# Patient Record
Sex: FEMALE | Race: WHITE | NOT HISPANIC OR LATINO | Employment: FULL TIME | URBAN - NONMETROPOLITAN AREA
[De-identification: names, ages, dates, MRNs, and addresses within clinical notes are randomized per-mention and may not be internally consistent; named-entity substitution may affect disease eponyms.]

---

## 2018-05-17 PROBLEM — G43.829 MENSTRUAL MIGRAINE WITHOUT STATUS MIGRAINOSUS, NOT INTRACTABLE: Status: ACTIVE | Noted: 2018-05-17

## 2018-11-05 PROBLEM — B35.1 ONYCHOMYCOSIS: Status: ACTIVE | Noted: 2018-11-05

## 2019-02-20 PROBLEM — N92.0 MENORRHAGIA WITH REGULAR CYCLE: Status: ACTIVE | Noted: 2019-02-20

## 2019-06-25 PROBLEM — E88.01 LOW PLASMA ALPHA-1 ANTITRYPSIN: Status: ACTIVE | Noted: 2019-06-25

## 2020-12-07 ENCOUNTER — TELEPHONE (OUTPATIENT)
Dept: FAMILY MEDICINE CLINIC | Facility: CLINIC | Age: 40
End: 2020-12-07

## 2020-12-07 NOTE — TELEPHONE ENCOUNTER
PT CALLED STATING SHE HAS A FEVER, SORE THROAT, AND BODY ACHES    PT AGREED TO A VIDEO VISIT BUT THE FIRST AVAILABLE WAS 12/11    PT STATED SHE DID TEST NEGATIVE FOR COVID AND STREP AT URGENT CARE    PLEASE ADVISE At: [phone]

## 2021-02-22 ENCOUNTER — TELEPHONE (OUTPATIENT)
Dept: FAMILY MEDICINE CLINIC | Facility: CLINIC | Age: 41
End: 2021-02-22

## 2021-02-22 NOTE — TELEPHONE ENCOUNTER
Patient is due for tetanus shot.  Can you place an order for patient to come in today and have it done?

## 2021-02-22 NOTE — TELEPHONE ENCOUNTER
PT CALLED REQUESTING AN ORDER FOR A TETANUS SHOT    PT IS REQUESTING TO GET IT TODAY    PLEASE ADVISE AT: [phone]

## 2021-03-19 PROBLEM — Z20.822 SUSPECTED COVID-19 VIRUS INFECTION: Status: RESOLVED | Noted: 2020-08-06 | Resolved: 2021-03-19

## 2021-08-23 ENCOUNTER — TELEPHONE (OUTPATIENT)
Dept: FAMILY MEDICINE CLINIC | Facility: CLINIC | Age: 41
End: 2021-08-23

## 2021-08-23 NOTE — TELEPHONE ENCOUNTER
Caller: Juliann Haynes    Relationship to patient: Self    Best call back number: [phone]    Date of exposure:LAST WEEK    Date of positive COVID19 test:     Date if possible COVID19 exposure:     COVID19 symptoms: SHORTNESS OF BREATH; COUGH    Date of initial quarantine:     Additional information or concerns:    What is the patients preferred pharmacy:    [id number] [place] [address] - [phone] [facility] [phone] FX

## 2021-08-24 NOTE — TELEPHONE ENCOUNTER
Spoke with patient she is having chest congestion and nasal congestion.  Patients  tested positive for COVID 08/17/2021.  She tested negative however, she is scheduled this morning with [facility] for COVID swab only.  She will let us know the results.

## 2022-06-13 ENCOUNTER — TELEPHONE (OUTPATIENT)
Dept: FAMILY MEDICINE CLINIC | Facility: CLINIC | Age: 42
End: 2022-06-13

## 2022-06-13 NOTE — TELEPHONE ENCOUNTER
Caller: rOen Juliann WEAVER    Relationship: Self    Best call back number: [phone]    What medication are you requesting: ANTIBIOTIC     What are your current symptoms: LEFT CHEEK IS SWOLLEN AND SORE,L HEADACHES, DIZZINESS   How long have you been experiencing symptoms: A WEEK     Have you had these symptoms before:    [x] Yes  [] No    Have you been treated for these symptoms before:   [x] Yes  [] No    If a prescription is needed, what is your preferred pharmacy and phone number[facility] [address] [place] [address] - [phone] [facility] [phone] FX     Additional notes:IVETH STATES THAT SHE DOES NOT SEEM TO BE GETTING ANY BETTER

## 2023-04-04 ENCOUNTER — OFFICE VISIT (OUTPATIENT)
Dept: FAMILY MEDICINE CLINIC | Facility: CLINIC | Age: 43
End: 2023-04-04
Payer: COMMERCIAL

## 2023-04-04 VITALS
DIASTOLIC BLOOD PRESSURE: 70 MMHG | HEART RATE: 75 BPM | HEIGHT: 60 IN | SYSTOLIC BLOOD PRESSURE: 116 MMHG | WEIGHT: 150 LBS | OXYGEN SATURATION: 98 % | BODY MASS INDEX: 29.45 KG/M2

## 2023-04-04 DIAGNOSIS — R07.81 PLEURITIC CHEST PAIN: ICD-10-CM

## 2023-04-04 DIAGNOSIS — R07.89 ATYPICAL CHEST PAIN: Primary | ICD-10-CM

## 2023-04-04 DIAGNOSIS — R10.13 EPIGASTRIC PAIN: ICD-10-CM

## 2023-04-04 DIAGNOSIS — Z87.891 EX-SMOKER: ICD-10-CM

## 2023-04-04 DIAGNOSIS — R12 HEARTBURN: ICD-10-CM

## 2023-04-04 PROCEDURE — 99214 OFFICE O/P EST MOD 30 MIN: CPT | Performed by: NURSE PRACTITIONER

## 2023-04-04 RX ORDER — PREDNISONE 10 MG/1
TABLET ORAL
Qty: 15 TABLET | Refills: 0 | Status: SHIPPED | OUTPATIENT
Start: 2023-04-04

## 2023-04-04 RX ORDER — TERBINAFINE HYDROCHLORIDE 250 MG/1
250 TABLET ORAL DAILY
COMMUNITY

## 2023-04-04 RX ORDER — OMEPRAZOLE 20 MG/1
20 CAPSULE, DELAYED RELEASE ORAL DAILY
Qty: 30 CAPSULE | Refills: 2 | Status: SHIPPED | OUTPATIENT
Start: 2023-04-04

## 2023-04-04 NOTE — PROGRESS NOTES
"                      Established Patient        Chief Complaint:   Chief Complaint   Patient presents with   • Chest Pain     Complaints of on going chest pain. Patient states that this has been on going for a year.  Patient would like to discuss getting blood work.         History of Present Illness:    Juliann CASTILLO is a 42 y.o. female who presents today with intermittent chest pain x 2 years. Has had intermittent chest pain throughout the day and sometimes left neck pain. Located epigastric region, sternum, right upper chest wall, and sometimes in her back. Reports that last night she was getting ready to lie down when she started to have chest pain, characterized as a \"throbbing\" and \"aching\" sensation. Aggravated by deep inspiration and spicy foods. Nothing alleviates the pain. Pain radiates to different portions of her chest. No specific time of the day. Pain scored as a 8 on a 0-10 scale at the worst and then it \"eases off.\" Seen for presenting complaint before by Dr. Gilliland--quit eating fried foods and butter--didn't help. Reports that she has been to a Dr. Dominic Artis, cardiologist 4/21; she wore a Holter monitor and had echocardiogram--normal results. Sees a pulmonologist because of having the Alpha 1 gene and diagnosis of asthma--quit smoking 2019.  Denies anxiety.    Last Lipid Panel 03/21  The 10-year ASCVD risk score (Anish GORDON, et al., 2019) is: 0.5%    Values used to calculate the score:      Age: 42 years      Sex: Female      Is Non- : No      Diabetic: No      Tobacco smoker: No      Systolic Blood Pressure: 116 mmHg      Is BP treated: No      HDL Cholesterol: 54 mg/dL      Total Cholesterol: 203 mg/dL    Subjective     The following portions of the patient's history were reviewed and updated as appropriate: allergies, current medications, past family history, past medical history, past social history, past surgical history and problem list.    ALLERGIES  No Known " "Allergies    ROS  Review of Systems   HENT: Negative.    Eyes: Negative.    Respiratory: Positive for chest tightness and shortness of breath.    Cardiovascular: Positive for chest pain and palpitations.   Gastrointestinal: Positive for abdominal pain and nausea.        Top of stomach, bottom of chest   Endocrine: Negative.    Genitourinary: Negative.    Musculoskeletal: Negative.    Skin: Negative.    Allergic/Immunologic: Negative.    Neurological: Positive for weakness.   Hematological: Negative.    Psychiatric/Behavioral: Negative.        Objective     Vital Signs:   /70   Pulse 75   Ht 152.4 cm (60\")   Wt 68 kg (150 lb)   SpO2 98%   BMI 29.29 kg/m²     BMI is >= 25 and <30. (Overweight) The following options were offered after discussion;: referral to primary care       Physical Exam   Physical Exam  Constitutional:       Appearance: Normal appearance.   HENT:      Right Ear: Tympanic membrane normal.      Left Ear: Tympanic membrane normal.      Nose: Nose normal.      Mouth/Throat:      Mouth: Mucous membranes are moist.   Eyes:      Pupils: Pupils are equal, round, and reactive to light.   Cardiovascular:      Rate and Rhythm: Normal rate and regular rhythm.      Pulses: Normal pulses.      Heart sounds: Normal heart sounds.   Pulmonary:      Effort: Pulmonary effort is normal. No respiratory distress.      Breath sounds: Normal breath sounds. No stridor or decreased air movement. No decreased breath sounds, wheezing, rhonchi or rales.   Abdominal:      Palpations: Abdomen is soft.   Musculoskeletal:         General: Normal range of motion.      Cervical back: Normal range of motion.   Skin:     General: Skin is warm.      Capillary Refill: Capillary refill takes less than 2 seconds.   Neurological:      General: No focal deficit present.      Mental Status: She is alert and oriented to person, place, and time.   Psychiatric:         Mood and Affect: Mood normal.         Behavior: Behavior normal. "         Assessment and Plan      Assessment/Plan:   Diagnoses and all orders for this visit:    1. Atypical chest pain (Primary)  -     CBC w AUTO Differential  -     Comprehensive metabolic panel  -     Lipid panel  -     XR Chest PA & Lateral; Future    2. Epigastric pain  -     US Gallbladder; Future  -     omeprazole (priLOSEC) 20 MG capsule; Take 1 capsule by mouth Daily.  Dispense: 30 capsule; Refill: 2  -     predniSONE (DELTASONE) 10 MG tablet; Take 5 tablets today, decrease dose by 1 tablet each day until gone. 5,4,3,2,1  Dispense: 15 tablet; Refill: 0    3. Heartburn    4. Pleuritic chest pain    5. Ex-smoker        Discussion Summary:  Discussed plan of care in detail with pt today; pt verb understanding and agrees.      I spent 35 minutes caring for Juliann on this date of service. This time includes time spent by me in the following activities:preparing for the visit, reviewing tests, obtaining and/or reviewing a separately obtained history, performing a medically appropriate examination and/or evaluation , counseling and educating the patient/family/caregiver, ordering medications, tests, or procedures, documenting information in the medical record and independently interpreting results and communicating that information with the patient/family/caregiver    I, KAYLA Tran, personally performed the services described in this documentation, as scribed by Jeanne Woods in my presence, and is both accurate and complete.     I have reviewed and updated all copied forward information, as appropriate.  I attest to the accuracy and relevance of any unchanged information.      Follow up:  Return in about 3 months (around 7/4/2023).     Patient Education:  There are no Patient Instructions on file for this visit.    KAYLA Tran  04/04/23  13:58 EDT          Please note that portions of this note may have been completed with a voice recognition program.

## 2023-04-05 LAB
ALBUMIN SERPL-MCNC: 4.3 G/DL (ref 3.5–5.2)
ALBUMIN/GLOB SERPL: 2 G/DL
ALP SERPL-CCNC: 46 U/L (ref 39–117)
ALT SERPL-CCNC: 10 U/L (ref 1–33)
AST SERPL-CCNC: 9 U/L (ref 1–32)
BASOPHILS # BLD AUTO: 0.09 10*3/MM3 (ref 0–0.2)
BASOPHILS NFR BLD AUTO: 1.3 % (ref 0–1.5)
BILIRUB SERPL-MCNC: 0.2 MG/DL (ref 0–1.2)
BUN SERPL-MCNC: 9 MG/DL (ref 6–20)
BUN/CREAT SERPL: 13.6 (ref 7–25)
CALCIUM SERPL-MCNC: 9.4 MG/DL (ref 8.6–10.5)
CHLORIDE SERPL-SCNC: 107 MMOL/L (ref 98–107)
CHOLEST SERPL-MCNC: 199 MG/DL (ref 0–200)
CO2 SERPL-SCNC: 26.1 MMOL/L (ref 22–29)
CREAT SERPL-MCNC: 0.66 MG/DL (ref 0.57–1)
EGFRCR SERPLBLD CKD-EPI 2021: 113.2 ML/MIN/1.73
EOSINOPHIL # BLD AUTO: 0.19 10*3/MM3 (ref 0–0.4)
EOSINOPHIL NFR BLD AUTO: 2.7 % (ref 0.3–6.2)
ERYTHROCYTE [DISTWIDTH] IN BLOOD BY AUTOMATED COUNT: 12.4 % (ref 12.3–15.4)
GLOBULIN SER CALC-MCNC: 2.1 GM/DL
GLUCOSE SERPL-MCNC: 102 MG/DL (ref 65–99)
HCT VFR BLD AUTO: 42.6 % (ref 34–46.6)
HDLC SERPL-MCNC: 58 MG/DL (ref 40–60)
HGB BLD-MCNC: 13.6 G/DL (ref 12–15.9)
IMM GRANULOCYTES # BLD AUTO: 0.01 10*3/MM3 (ref 0–0.05)
IMM GRANULOCYTES NFR BLD AUTO: 0.1 % (ref 0–0.5)
LDLC SERPL CALC-MCNC: 128 MG/DL (ref 0–100)
LYMPHOCYTES # BLD AUTO: 1.88 10*3/MM3 (ref 0.7–3.1)
LYMPHOCYTES NFR BLD AUTO: 26.4 % (ref 19.6–45.3)
MCH RBC QN AUTO: 29.9 PG (ref 26.6–33)
MCHC RBC AUTO-ENTMCNC: 31.9 G/DL (ref 31.5–35.7)
MCV RBC AUTO: 93.6 FL (ref 79–97)
MONOCYTES # BLD AUTO: 0.42 10*3/MM3 (ref 0.1–0.9)
MONOCYTES NFR BLD AUTO: 5.9 % (ref 5–12)
NEUTROPHILS # BLD AUTO: 4.54 10*3/MM3 (ref 1.7–7)
NEUTROPHILS NFR BLD AUTO: 63.6 % (ref 42.7–76)
NRBC BLD AUTO-RTO: 0 /100 WBC (ref 0–0.2)
PLATELET # BLD AUTO: 333 10*3/MM3 (ref 140–450)
POTASSIUM SERPL-SCNC: 4.2 MMOL/L (ref 3.5–5.2)
PROT SERPL-MCNC: 6.4 G/DL (ref 6–8.5)
RBC # BLD AUTO: 4.55 10*6/MM3 (ref 3.77–5.28)
SODIUM SERPL-SCNC: 141 MMOL/L (ref 136–145)
TRIGL SERPL-MCNC: 70 MG/DL (ref 0–150)
VLDLC SERPL CALC-MCNC: 13 MG/DL (ref 5–40)
WBC # BLD AUTO: 7.13 10*3/MM3 (ref 3.4–10.8)

## 2023-06-20 ENCOUNTER — TELEPHONE (OUTPATIENT)
Dept: FAMILY MEDICINE CLINIC | Facility: CLINIC | Age: 43
End: 2023-06-20

## 2023-06-20 DIAGNOSIS — F40.243 FEAR OF FLYING: Primary | ICD-10-CM

## 2023-06-20 NOTE — TELEPHONE ENCOUNTER
Caller: Juliann Benitez    Relationship: Self    Best call back number:       [phone]     What medication are you requesting:     PATIENT STATED SHE WILL BE FLYING ON FRIDAY MORNING, 6/23    What are your current symptoms:     PATIENT REQUESTED A MEDICATION PRESCRIBED THAT WILL HELP WITH ANXIETY IN REGARD TO FLYING    PATIENT REQUESTED (2) TABLETS PRESCRIBED - ONE FOR THE INITIAL FLIGHT AND ONE TO RETURN    PATIENT ALSO REQUESTED THE MEDICATION TO NOT CAUSE DROWSINESS    Have you had these symptoms before:    [x] Yes  [] No    Have you been treated for these symptoms before:   [] Yes  [x] No    If a prescription is needed, what is your preferred pharmacy and phone number:      [facility] [place] [employer], [place]    TELEPHONE CONTACT:    [phone]    PATIENT REQUESTED A CALL BACK WHEN MEDICATION PRESCRIPTION HAS BEEN SENT TO THE PHARMACY, OR IF THERE ARE FURTHER QUESTIONS    DR HAZEL

## 2023-06-21 RX ORDER — LORAZEPAM 0.5 MG/1
TABLET ORAL
Qty: 4 TABLET | Refills: 0 | Status: SHIPPED | OUTPATIENT
Start: 2023-06-21 | End: 2023-08-30

## 2023-07-31 ENCOUNTER — TELEPHONE (OUTPATIENT)
Dept: FAMILY MEDICINE CLINIC | Facility: CLINIC | Age: 43
End: 2023-07-31
Payer: COMMERCIAL

## 2023-07-31 DIAGNOSIS — R07.89 ATYPICAL CHEST PAIN: Primary | ICD-10-CM

## 2023-08-21 ENCOUNTER — TELEPHONE (OUTPATIENT)
Dept: FAMILY MEDICINE CLINIC | Facility: CLINIC | Age: 43
End: 2023-08-21

## 2023-08-21 NOTE — TELEPHONE ENCOUNTER
Caller: Juliann Benitez    Relationship to patient: Self    Best call back number: [phone] -OK TO LEAVE DETAILED MESSAGE IF NO ANSWER    Chief complaint: LEFT SIDE SHOULDER AND ARM PAIN    Type of visit: IN OFFICE SICK VISIT    Requested date: TODAY OR TOMORROW AT LATEST     If rescheduling, when is the original appointment: 8/30/23     Additional notes:PATIENT ASKING TO BE SEEN TODAY. STATES SHE CAN NOT LIFT HER ARM AND IT HURTS SO BAD SHE CAN'T SLEEP, CAN'T DRESS HERSELF OR  PUT ON HER BRA.    PLEASE CALL TO SCHEDULE TODAY OR TOMORROW AT LATEST.

## 2023-08-30 ENCOUNTER — OFFICE VISIT (OUTPATIENT)
Dept: FAMILY MEDICINE CLINIC | Facility: CLINIC | Age: 43
End: 2023-08-30
Payer: COMMERCIAL

## 2023-08-30 VITALS
HEART RATE: 72 BPM | WEIGHT: 153.6 LBS | BODY MASS INDEX: 30.15 KG/M2 | OXYGEN SATURATION: 98 % | TEMPERATURE: 98.6 F | SYSTOLIC BLOOD PRESSURE: 118 MMHG | DIASTOLIC BLOOD PRESSURE: 74 MMHG | HEIGHT: 60 IN

## 2023-08-30 DIAGNOSIS — G89.29 CHRONIC LEFT SHOULDER PAIN: Primary | ICD-10-CM

## 2023-08-30 DIAGNOSIS — M25.512 CHRONIC LEFT SHOULDER PAIN: Primary | ICD-10-CM

## 2023-08-30 RX ORDER — TRIAMCINOLONE ACETONIDE 40 MG/ML
40 INJECTION, SUSPENSION INTRA-ARTICULAR; INTRAMUSCULAR ONCE
Status: SHIPPED | OUTPATIENT
Start: 2023-08-30

## 2023-08-30 RX ORDER — LIDOCAINE HYDROCHLORIDE 10 MG/ML
8 INJECTION, SOLUTION INFILTRATION; PERINEURAL ONCE
Status: SHIPPED | OUTPATIENT
Start: 2023-08-30

## 2023-08-30 NOTE — PROGRESS NOTES
Subjective   Juliann Benitez is a 42 y.o. female.     History of Present Illness  Here with c/o left upper arm/shoulder pain. Began approx 2 month ago. Worsening. 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 No numbness, weakness LUE. Right hand dominant. Worsening ROM loss. Pain with reaching overhead or behind.    The following portions of the patient's history were reviewed and updated as appropriate: allergies, current medications, past family history, past medical history, past social history, past surgical history, and problem list.    Review of Systems   Constitutional:  Negative for fever.   Respiratory:  Negative for cough.    Musculoskeletal:  Positive for arthralgias and myalgias. Negative for neck pain.   Skin:  Negative for rash.   Hematological:  Negative for adenopathy.     Objective   Vitals:    08/30/23 1412   BP: 118/74   Pulse: 72   Temp: 98.6 øF (37 øC)   SpO2: 98%     Body mass index is 30 kg/mý.      08/30/23  1412   Weight: 69.7 kg (153 lb 9.6 oz)           Physical Exam  Vitals and nursing note reviewed.   Constitutional:       General: She is not in acute distress.     Appearance: She is well-developed, well-groomed and overweight. She is not ill-appearing.   Cardiovascular:      Rate and Rhythm: Normal rate and regular rhythm.      Pulses: Normal pulses.      Heart sounds: Normal heart sounds.   Pulmonary:      Effort: Pulmonary effort is normal.      Breath sounds: Normal breath sounds.   Musculoskeletal:      Left shoulder: No swelling or deformity. Decreased range of motion (flexion to 120 deg, abduction to 90 deg, mildly limited external rotation, + impingement signs with limited internal rotaiton due to pain).      Right lower leg: No edema.      Left lower leg: No edema.   Skin:     General: Skin is warm and dry.      Findings: No bruising or rash.   Neurological:      Mental Status: She is alert and oriented to person, place, and time.       Sensory: Sensation is intact.      Motor: Motor function is intact.   Psychiatric:         Behavior: Behavior normal. Behavior is cooperative.         Cognition and Memory: Cognition normal.     Assessment & Plan   Diagnoses and all orders for this visit:    1. Chronic left shoulder pain (Primary)  -     Arthrocentesis  -     triamcinolone acetonide (KENALOG-40) injection 40 mg  -     lidocaine (XYLOCAINE) 1 % injection 8 mL       Overuse injury, RTC tendonitis/bursitis. I have reviewed risks/benefits and potential side effects of various treatment options. Pt voices understanding and wishes to proceed with therapeutic injection. If minimal improvement consider imaging, referral to ortho and/or PT as indicated.    Patient was encouraged to keep me informed of any acute changes, lack of improvement, or any new concerning symptoms.  Pt is aware of reasons to seek emergent care.  Patient voiced understanding of all instructions and denied further questions.      PROCEDURE NOTE  Informed consent obtained.  Time out protocol performed prior to procedure    Arthrocentesis/Joint injection  Date/Time: 8/30/2023 at 3:15 PM  Performed by: Lauren Gilliland MD  Authorized by: Lauren Gilliland MD   Indications: pain and diagnostic evaluation   Body area: shoulder  Joint: left shoulder  Local anesthesia used: no   Anesthesia:  Local anesthesia used: no   Sedation:  Patient sedated: no   Preparation: Patient was prepped and draped in the usual sterile fashion.  Needle size: 22 G (1.5 inch)  Ultrasound guidance: no  Approach: posterior  Aspirate amount: 0 mL  Patient tolerance: patient tolerated the procedure well with no immediate complications  Comments: Medications used include:   Lidocaine 1% without epi total of 8 ml  Kenalog 40 mg  1/2 volume to AC joint, 1/2 volume to subacromial area  Single skin entry      Post procedure care reviewed.  Pt voiced understanding and denied further questions.    Please note that portions of  this note may have been completed with a voice recognition program.

## 2023-09-11 ENCOUNTER — TELEPHONE (OUTPATIENT)
Dept: FAMILY MEDICINE CLINIC | Facility: CLINIC | Age: 43
End: 2023-09-11

## 2023-09-11 NOTE — TELEPHONE ENCOUNTER
MACK IS CALLING TO SAY HER LEFT ARM HURTS AGAIN.  IT FELT BETTER FOR 2 DAYS AFTER THE SHOT AND NOW IS BACK TO THE WAY IT WAS.

## 2023-09-18 ENCOUNTER — TELEPHONE (OUTPATIENT)
Dept: FAMILY MEDICINE CLINIC | Facility: CLINIC | Age: 43
End: 2023-09-18
Payer: COMMERCIAL

## 2023-09-18 DIAGNOSIS — M25.512 CHRONIC LEFT SHOULDER PAIN: Primary | ICD-10-CM

## 2023-09-18 DIAGNOSIS — G89.29 CHRONIC LEFT SHOULDER PAIN: Primary | ICD-10-CM

## 2023-09-18 NOTE — TELEPHONE ENCOUNTER
PATIENT CALLED AND SAID THE SHOT FOR HER LEFT SHOULDER WORKED FOR A COUPLE DAYS, BUT THE PAIN IS BACK AGAIN. SHE SAID YOU MENTIONED ORDERING AN MRI IF SHE NEEDED IT. ASKED FOR IT TO BE DONE AT [facility]

## 2023-09-19 NOTE — TELEPHONE ENCOUNTER
Patient insurance would not approve MRI Shoulder without plain film XR being done first. I called and discussed this with patient and she is agreeable to XR. Let me know when order has been entered and I will call her to let her know she can go complete it at her convenience. Thank you!

## 2023-09-25 ENCOUNTER — HOSPITAL ENCOUNTER (OUTPATIENT)
Dept: GENERAL RADIOLOGY | Facility: HOSPITAL | Age: 43
Discharge: HOME | End: 2023-09-25
Admitting: FAMILY MEDICINE
Payer: COMMERCIAL

## 2023-09-25 DIAGNOSIS — G89.29 CHRONIC LEFT SHOULDER PAIN: ICD-10-CM

## 2023-09-25 DIAGNOSIS — M25.512 CHRONIC LEFT SHOULDER PAIN: ICD-10-CM

## 2023-09-25 PROCEDURE — 73030 X-RAY EXAM OF SHOULDER: CPT

## 2023-09-26 DIAGNOSIS — M25.512 CHRONIC LEFT SHOULDER PAIN: Primary | ICD-10-CM

## 2023-09-26 DIAGNOSIS — M75.52 BURSITIS OF LEFT SHOULDER: ICD-10-CM

## 2023-09-26 DIAGNOSIS — G89.29 CHRONIC LEFT SHOULDER PAIN: Primary | ICD-10-CM

## 2023-09-30 DIAGNOSIS — M75.102 TEAR OF LEFT SUPRASPINATUS TENDON: Primary | ICD-10-CM

## 2023-10-02 ENCOUNTER — OFFICE VISIT (OUTPATIENT)
Dept: FAMILY MEDICINE CLINIC | Facility: CLINIC | Age: 43
End: 2023-10-02
Payer: COMMERCIAL

## 2023-10-02 VITALS
BODY MASS INDEX: 31.22 KG/M2 | WEIGHT: 159 LBS | DIASTOLIC BLOOD PRESSURE: 76 MMHG | OXYGEN SATURATION: 98 % | SYSTOLIC BLOOD PRESSURE: 122 MMHG | HEIGHT: 60 IN | TEMPERATURE: 98.1 F | HEART RATE: 71 BPM

## 2023-10-02 DIAGNOSIS — M75.52 CHRONIC BURSITIS OF LEFT SHOULDER: ICD-10-CM

## 2023-10-02 DIAGNOSIS — M75.102 TEAR OF LEFT SUPRASPINATUS TENDON: Primary | ICD-10-CM

## 2023-10-02 PROCEDURE — 99213 OFFICE O/P EST LOW 20 MIN: CPT | Performed by: FAMILY MEDICINE

## 2023-10-02 RX ORDER — MULTIPLE VITAMINS W/ MINERALS TAB 9MG-400MCG
1 TAB ORAL DAILY
COMMUNITY

## 2023-10-02 NOTE — PROGRESS NOTES
Subjective   Juliann Benitez is a 42 y.o. female.     History of Present Illness  Here for f/u on chronic left shoulder pain. Present x 3 months. No known injury but has repetitive use job. No better with conservative mgnt. 2-3 day improvement with therapeutic injection. MRI confirmed bursitis, supraspinatus tear. 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 Had to leave work Saturday due to pain and significant loss of ROM.    Using OTC analgescis, biofreeze with some releif.    The following portions of the patient's history were reviewed and updated as appropriate: allergies, current medications, past family history, past medical history, past social history, past surgical history, and problem list.    Review of Systems   Constitutional:  Negative for fever.   Respiratory:  Negative for cough.    Musculoskeletal:  Positive for arthralgias and myalgias. Negative for neck pain.   Skin:  Negative for rash.   Neurological:  Positive for weakness (LUE due to pain). Negative for numbness.   Hematological:  Negative for adenopathy.   Psychiatric/Behavioral:  Positive for sleep disturbance (due to pain).      Objective   Vitals:    10/02/23 0817   BP: 122/76   Pulse: 71   Temp: 98.1 °F (36.7 °C)   SpO2: 98%     Body mass index is 31.05 kg/m².      10/02/23  0817   Weight: 72.1 kg (159 lb)     Physical Exam  Vitals and nursing note reviewed.   Constitutional:       General: She is not in acute distress.     Appearance: She is well-developed and well-groomed. She is not ill-appearing.   Cardiovascular:      Pulses: Normal pulses.   Pulmonary:      Effort: Pulmonary effort is normal.   Musculoskeletal:      Left shoulder: No swelling or deformity. Decreased range of motion (flexion to 90  deg, abduction to <90 deg, limited external rotation, + impingement signs with limited internal rotation due to pain).   Skin:     General: Skin is warm and dry.      Findings: No bruising or rash.   Neurological:       Mental Status: She is alert and oriented to person, place, and time.      Sensory: Sensation is intact.      Motor: Motor function is intact.   Psychiatric:         Behavior: Behavior normal. Behavior is cooperative.         Cognition and Memory: Cognition normal.     XR Shoulder 2+ View Left  Result Date: 9/25/2023  No acute fracture.  Increased subacromial distance is suggestive of bursitis. Consider MRI for further evaluation.       Images were reviewed, interpreted, and dictated by Dr. Rosita Hernandez MD Transcribed by Joann Villalobos PA-C.  This report was signed and finalized on 9/25/2023 2:04 PM by Rosita Hernandez MD.      MR upper extremity joint only without IV contrast left side  Result Date: 9/29/2023  1. Small but high-grade partial-thickness tear of the distal supraspinatus tendon with mild muscle edema along the acromial surface. 2. No evidence of labral tear.       Assessment & Plan   Diagnoses and all orders for this visit:    1. Tear of left supraspinatus tendon (Primary)    2. Chronic bursitis of left shoulder       Ortho referral placed last week. Pt awaiting apt. Off work until eval by ortho and plan of care determined.  Patient was encouraged to keep me informed of any acute changes, lack of improvement, or any new concerning symptoms.  Pt is aware of reasons to seek emergent care.  Patient voiced understanding of all instructions and denied further questions.    Please note that portions of this note may have been completed with a voice recognition program.

## 2023-10-03 ENCOUNTER — OFFICE VISIT (OUTPATIENT)
Dept: ORTHOPEDIC SURGERY | Facility: CLINIC | Age: 43
End: 2023-10-03
Payer: COMMERCIAL

## 2023-10-03 VITALS
HEART RATE: 65 BPM | DIASTOLIC BLOOD PRESSURE: 86 MMHG | HEIGHT: 60 IN | WEIGHT: 154.2 LBS | BODY MASS INDEX: 30.27 KG/M2 | SYSTOLIC BLOOD PRESSURE: 127 MMHG

## 2023-10-03 DIAGNOSIS — M75.102 NONTRAUMATIC TEAR OF LEFT SUPRASPINATUS TENDON: Primary | ICD-10-CM

## 2023-10-03 RX ORDER — DEXAMETHASONE SODIUM PHOSPHATE 4 MG/ML
10 INJECTION, SOLUTION INTRA-ARTICULAR; INTRALESIONAL; INTRAMUSCULAR; INTRAVENOUS; SOFT TISSUE TAKE AS DIRECTED
Qty: 30 ML | Refills: 0 | Status: SHIPPED | OUTPATIENT
Start: 2023-10-03

## 2023-10-03 NOTE — LETTER
October 3, 2023     Patient: Juliann Benitez   YOB: 1980   Date of Visit: 10/3/2023       To Whom It May Concern:    It is my medical opinion that Juliann Benitez should remain out of work until 10/24/23.           Sincerely,        Joshua Jacob PA-C

## 2023-10-03 NOTE — PROGRESS NOTES
Office Note     Name: Juliann Benitez    : 1980     MRN: [id number]     Chief Complaint  Follow-up of the Left Shoulder (States she has been having pain for about two months, no known injury. The pain is in her shoulder down into her upper arm. She had a cortisone injection in her shoulder at the PCP on 23.)    Subjective     History of Present Illness:  Juliann Benitez is a 42 y.o. female presenting today for left shoulder pain.  She denies known injury.  States that she began a few months ago after getting out of bed.  Recently the pain is worse and she is having difficulty moving and using the arm due to pain.  She had an MRI of the left shoulder at [facility] which revealed a partial RTC tear of the supraspinatus.  She has tried an cortisone injection which only helped for 2 days and then the pain returned.  Ibuprofen x0bimep and Biofreeze does help.  Has tried PT for 2 weeks but discontinued it because it provided no relief and increased her pain.      Review of Systems   Constitutional:  Negative for fever.   HENT:  Negative for dental problem and voice change.    Eyes:  Negative for visual disturbance.   Respiratory:  Negative for shortness of breath.    Cardiovascular:  Negative for chest pain.   Gastrointestinal:  Negative for abdominal pain.   Genitourinary:  Negative for dysuria.   Musculoskeletal:  Positive for arthralgias (left shoulder, upper arm). Negative for gait problem and joint swelling.   Skin:  Negative for rash.   Neurological:  Negative for speech difficulty.   Hematological:  Does not bruise/bleed easily.   Psychiatric/Behavioral:  Negative for confusion.       Past Medical History:   Diagnosis Date    Alpha-1-antitrypsin deficiency     Heart murmur     Kidney infection     Kidney stone     Pregnancy     A1 s/p  x 2    Tattoo     X5    Ureteral reflux     PT UNSURE ABOUT THIS IN THE PAST        Past Surgical History:   Procedure Laterality Date    AUGMENTATION MAMMAPLASTY       BREAST AUGMENTATION      D & C HYSTEROSCOPY ENDOMETRIAL ABLATION N/A 3/4/2019    Procedure: DILATATION AND CURETTAGE HYSTEROSCOPY NOVASURE ENDOMETRIAL ABLATION;  Surgeon: Paxton Amos MD;  Location: [facility];  Service: Obstetrics/Gynecology    DILATATION AND CURETTAGE      ENDOMETRIAL ABLATION      EXPLORATORY LAPAROTOMY      STATES CYSTS REMOVED FROM OVARIES    HYSTEROSCOPY         Family History   Problem Relation Age of Onset    Pancreatic cancer Mother     Heart attack Mother 50    Diabetes Mother     Prostate cancer Father     Hypertension Father     Diabetes Maternal Grandmother     Breast cancer Neg Hx        Social History     Socioeconomic History    Marital status:    Tobacco Use    Smoking status: Former     Packs/day: 1.00     Years: 15.00     Pack years: 15.00     Types: Cigarettes     Start date:      Quit date: 2019     Years since quittin.3     Passive exposure: Past    Smokeless tobacco: Never   Vaping Use    Vaping Use: Never used   Substance and Sexual Activity    Alcohol use: Not Currently     Comment: very rarely    Drug use: No    Sexual activity: Defer         Current Outpatient Medications:     albuterol sulfate HFA (Ventolin HFA) 108 (90 Base) MCG/ACT inhaler, Inhale 2 puffs Every 4 (Four) Hours As Needed for Wheezing or Shortness of Air., Disp: 18 g, Rfl: 3    budesonide-formoterol (Symbicort) 160-4.5 MCG/ACT inhaler, Inhale 2 puffs 2 (Two) Times a Day. Rinse mouth with water after use., Disp: 10 g, Rfl: 5    dexAMETHasone (DECADRON) 4 MG/ML injection, Apply 2.5 mL topically to the appropriate area as directed Take As Directed. USE AS DIRECTED WITH PHYSICAL THERAPY (IONTOPHORESIS), Disp: 30 mL, Rfl: 0    multivitamin with minerals tablet tablet, Take 1 tablet by mouth Daily. Womens once daily, Disp: , Rfl:     Current Facility-Administered Medications:     lidocaine (XYLOCAINE) 1 % injection 8 mL, 8 mL, Infiltration, Once, Lauren Gilliland MD     "triamcinolone acetonide (KENALOG-40) injection 40 mg, 40 mg, Intra-articular, Once, Lauren Gilliland MD    No Known Allergies        Objective   /86   Pulse 65   Ht 152.4 cm (60\")   Wt 69.9 kg (154 lb 3.2 oz)   BMI 30.12 kg/m²            Physical Exam  Left Shoulder Exam     Tenderness   The patient is experiencing tenderness in the acromion.    Range of Motion   Active abduction:  30   Passive abduction:  40   Extension:  10   External rotation:  60   Forward flexion:  60   Internal rotation 0 degrees:  L5     Muscle Strength   Abduction: 3/5   Internal rotation: 4/5   External rotation: 3/5   Supraspinatus: 3/5   Subscapularis: 4/5   Biceps: 4/5     Other   Erythema: absent  Sensation: normal  Pulse: present          Extremity DVT signs are negative by clinical screen.     Independent Review of Radiographic Studies:    Impression    1. Small but high-grade partial-thickness tear of the distal  supraspinatus tendon with mild muscle edema along the acromial surface.  2. No evidence of labral tear.  Narrative    MR SHOULDER LEFT    TECHNIQUE: Multiplanar MR without contrast.    HISTORY: Pain. M25.512.    FINDINGS:    MARROW SIGNAL PATTERN: Unremarkable.    GLENOHUMERAL JOINT: Unremarkable.    AC JOINT: Trace fluid is noted in the subacromial bursa. There is no  significant impingement on the rotator cuff from the AC joint. There is  no significant arthropathy evident.    LABRUM: Normal morphology without tear.    ROTATOR CUFF APPARATUS: A small partial-thickness tear is seen measuring  4 mm of the distal supraspinatus tendon involving greater than 50% of  the tendon thickness at the humeral attachment. In addition there is  some edema which extends into the acromial surface of the supraspinatus  musculature, compatible with muscle strain. Remaining tendons are  intact.    BICEPS TENDON:  Intact proximally.  Exam End: 09/29/23 09:49    Spe        Procedures    Assessment and Plan   Diagnoses and all orders " for this visit:    1. Nontraumatic tear of left supraspinatus tendon (Primary)  -     Ambulatory Referral to Physical Therapy Ortho, Evaluate and treat; Heat, Electrotherapy; Iontophoresis; Soft Tissue Mobilizaton; Stretching, ROM, Strengthening; Feather weight bearing  -     dexAMETHasone (DECADRON) 4 MG/ML injection; Apply 2.5 mL topically to the appropriate area as directed Take As Directed. USE AS DIRECTED WITH PHYSICAL THERAPY (IONTOPHORESIS)  Dispense: 30 mL; Refill: 0       Regular exercise as tolerated  Orthopedic activities reviewed and patient expressed appreciation  Discussion of orthopedic goals  Risk, benefits, and merits of treatment alternatives reviewed with the patient and questions answered  Physical therapy referral given  Reduced physical activity as appropriate  Weight bearing parameters reviewed  Ice, heat, and/or modalities as beneficial  Guided on proper techniques for mobility, strength, agility and/or conditioning exercises    Recommendations/Plan:  Exercise, medications, injections, other patient advice, and return appointment as noted.  Referral: Physical and Occupational Therapy referral.  Patient is encouraged to call or return for any issues or concerns.    I discussed nonsurgical and surgical options with the patient concerning her supraspinatus partial-thickness tear.  I believe we should try additional conservative therapy before resorting to surgery and the patient is agreeable.  She was given a course of physical therapy to try for 3 to 4 weeks to see if it improves her symptoms, if no significant symptomatic relief we will consider arthroscopy with rotator cuff repair.  She was also given a sling to wear on the affected shoulder while she is walking for the next few weeks.  Also advised that we may repeat cortisone injection into the subacromial space at the 3-month merlin since her previous injection, the previous injection was on 8/30/2023.  I advised continuing over-the-counter  analgesics and ice and heat to the area as needed and beneficial.  Recheck is scheduled for 4 weeks to evaluate progress of physical therapy advised patient to call or return office sooner if needed.    Return in about 4 weeks (around 10/31/2023) for Recheck.  Patient agreeable to call or return sooner for any concerns.

## 2023-10-10 ENCOUNTER — TELEPHONE (OUTPATIENT)
Dept: FAMILY MEDICINE CLINIC | Facility: CLINIC | Age: 43
End: 2023-10-10
Payer: COMMERCIAL

## 2023-10-10 PROBLEM — M75.52 CHRONIC BURSITIS OF LEFT SHOULDER: Status: ACTIVE | Noted: 2023-10-10

## 2023-10-10 PROBLEM — M75.112 NONTRAUMATIC INCOMPLETE TEAR OF LEFT ROTATOR CUFF: Status: ACTIVE | Noted: 2023-10-10

## 2023-10-10 NOTE — TELEPHONE ENCOUNTER
"Pt's work related disability paperwork completed and ready for faxing. Placed in \"done\" folder on my desk.  "

## 2023-10-11 NOTE — TELEPHONE ENCOUNTER
Jacqui faxed to [facility]. I called patient to let her know if was sent. She didn't need a copy, but informed her it would be in her chart.

## 2023-10-13 ENCOUNTER — PATIENT ROUNDING (BHMG ONLY) (OUTPATIENT)
Dept: ORTHOPEDIC SURGERY | Facility: CLINIC | Age: 43
End: 2023-10-13
Payer: COMMERCIAL

## 2023-10-13 NOTE — PROGRESS NOTES
A [id number] message has been sent to the patient for patient [place] [facility]

## 2023-10-17 ENCOUNTER — TELEPHONE (OUTPATIENT)
Dept: ORTHOPEDIC SURGERY | Facility: CLINIC | Age: 43
End: 2023-10-17

## 2023-10-17 NOTE — TELEPHONE ENCOUNTER
Provider: DR. INGRAM    Caller: MACK CASTILLO    Relationship to Patient: SELF    Phone Number: [phone]    Reason for Call: PATIENT CALLED STATING PHYSICAL THERAPY HAS MADE HER LEFT SHOULDER WORSE. WANTS TO MOVE FORWARD WITH GETTING SURGERY. DOES 10/25/23 APPOINTMENT NEED TO BE MOVED? PLEASE ADVISE.

## 2023-10-17 NOTE — TELEPHONE ENCOUNTER
Called patient back, we will put her on the surgery schedule for 11/30/23. Adelia will call her to set up a pre-op appointment and PAT. Patient verbalized understanding.

## 2023-10-19 NOTE — TELEPHONE ENCOUNTER
Provider: DEATON    Caller: PATIENT    Phone Number: [phone]    Reason for Call: PATIENT IS ASKING IF SHE CAN BE PUT ON A CANCELLATION LIST FOR SURGERY FOR AN EARLIER DATE. SHE STATES SHE IS CONCERNED THAT SHE WON'T HAVE ENOUGH TIME FOR RECOVERY AFTER SURGERY SINCE SHE'S ALREADY BEEN OFF FOR A MONTH. SHE ALSO NEEDS ANOTHER NOTE FOR HER EMPLOYER PUTTING HER OFF WORK UNTIL SURGERY DATE. PATIENT IS ALSO ASKING IF SHORT TERM DISABILITY PAPERWORK HAS BEEN RECEIVED AND COMPLETED.

## 2023-10-19 NOTE — TELEPHONE ENCOUNTER
Spoke with patient, advised we do no have an opening for an earlier date at this time but we can call her if we have a cancellation. I will put in a work note but we have not received S.O.B papers. Patient verbalized understanding and will have them send them.

## 2023-10-20 ENCOUNTER — OFFICE VISIT (OUTPATIENT)
Dept: ORTHOPEDIC SURGERY | Facility: CLINIC | Age: 43
End: 2023-10-20
Payer: COMMERCIAL

## 2023-10-20 VITALS
SYSTOLIC BLOOD PRESSURE: 139 MMHG | DIASTOLIC BLOOD PRESSURE: 75 MMHG | WEIGHT: 154 LBS | BODY MASS INDEX: 30.23 KG/M2 | HEART RATE: 93 BPM | HEIGHT: 60 IN

## 2023-10-20 DIAGNOSIS — M75.112 NONTRAUMATIC INCOMPLETE TEAR OF LEFT ROTATOR CUFF: Primary | ICD-10-CM

## 2023-10-20 DIAGNOSIS — M54.2 NECK PAIN: ICD-10-CM

## 2023-10-20 DIAGNOSIS — R20.2 NUMBNESS AND TINGLING OF LEFT HAND: ICD-10-CM

## 2023-10-20 DIAGNOSIS — R20.0 NUMBNESS AND TINGLING OF LEFT HAND: ICD-10-CM

## 2023-10-20 PROCEDURE — 99213 OFFICE O/P EST LOW 20 MIN: CPT | Performed by: ORTHOPAEDIC SURGERY

## 2023-10-20 RX ORDER — METHYLPREDNISOLONE 4 MG/1
TABLET ORAL
Qty: 21 TABLET | Refills: 0 | Status: SHIPPED | OUTPATIENT
Start: 2023-10-20 | End: 2023-11-08

## 2023-10-25 NOTE — PROGRESS NOTES
Subjective   Patient ID: Juliann Benitez is a 42 y.o. right hand dominant female is here today for orthopaedic evaluation of recovery progress with treatment.  Follow-up of the Left Shoulder       CHIEF COMPLAINT:    Progress and recovery with treatment    History of Present Illness     Progress Note:  Patient states that with treatments and since the last visit, she has been doing routine formal physical therapy for five weeks.  She also had shoulder cortisone injection with her primary care Dr. Lauren Gilliland MD.  She has tried ibuprofen and topical Biofreeze without relief.  With these treatments, other than one day of relief after the injection, unfortunately her shoulder has gradually gotten worse.  She has gained some shoulder mobility with therapy.  She also describes occasional neck pain and stiffness, a burning sensation radiating down her arm and occasional tingling in her left hand.  She has been temporarily off work resting her shoulder for the past month.  She works for the [employer] in [place], [place].  She presents today for follow-up assessment of her left shoulder condition.    Past Medical History:   Diagnosis Date    Alpha-1-antitrypsin deficiency     Heart murmur     Kidney infection     Kidney stone     Pneumonia 9/10    Pregnancy     A1 s/p  x 2    Tattoo     X5    Ureteral reflux     PT UNSURE ABOUT THIS IN THE PAST        Past Surgical History:   Procedure Laterality Date    AUGMENTATION MAMMAPLASTY      BREAST AUGMENTATION      D & C HYSTEROSCOPY ENDOMETRIAL ABLATION N/A 3/4/2019    Procedure: DILATATION AND CURETTAGE HYSTEROSCOPY NOVASURE ENDOMETRIAL ABLATION;  Surgeon: Paxton Amos MD;  [facility];  Service: Obstetrics/Gynecology    DILATATION AND CURETTAGE      ENDOMETRIAL ABLATION      EXPLORATORY LAPAROTOMY      STATES CYSTS REMOVED FROM OVARIES    HYSTEROSCOPY          Family History   Problem Relation Age of Onset    Pancreatic cancer Mother     Heart attack  "Mother 50    Diabetes Mother     Prostate cancer Father     Hypertension Father     Diabetes Maternal Grandmother     Breast cancer Neg Hx         Social History     Socioeconomic History    Marital status:    Tobacco Use    Smoking status: Former     Packs/day: 1.00     Years: 15.00     Additional pack years: 0.00     Total pack years: 15.00     Types: Cigarettes     Start date: 2004     Quit date: 2019     Years since quittin.4     Passive exposure: Past    Smokeless tobacco: Never   Vaping Use    Vaping Use: Never used   Substance and Sexual Activity    Alcohol use: Not Currently     Comment: very rarely    Drug use: No    Sexual activity: Yes       No Known Allergies    Review of Systems   Constitutional:  Negative for fever.   Musculoskeletal:  Positive for arthralgias (left shoulder) and neck pain. Negative for gait problem and joint swelling.   Skin:  Negative for color change and rash.   Neurological:  Positive for weakness (left shoulder) and numbness (tingling left hand).   Psychiatric/Behavioral:  Negative for confusion.        I have reviewed the medical and surgical history, family history, social history, medications, and/or allergies, and the review of systems of this report.    Objective   /75   Pulse 93   Ht 152.4 cm (60\")   Wt 69.9 kg (154 lb)   BMI 30.08 kg/m²   Physical Exam  Left Hand Exam     Tenderness   The patient is experiencing tenderness in the palmar area (tingling left hand approximates median nerve distribution.  Mild thenar atrophy.).     Tests   Phalen’s sign: positive  Finkelstein's test: negative    Other   Erythema: absent  Sensation: decreased  Pulse: present      Left Shoulder Exam     Tenderness   Left shoulder tenderness location: diffuse.    Range of Motion   Active abduction:  90   External rotation:  60   Left shoulder forward flexion: 100 degrees, wall climb 130 degrees.     Muscle Strength   Abduction: 4/5   Internal rotation: 5/5   External " rotation: 4/5   Supraspinatus: 3/5   Biceps: 4/5     Tests   Apprehension: negative  Impingement: positive  Drop arm: positive (partial)    Other   Erythema: absent  Pulse: present           Neurologic Exam    Assessment & Plan     Independent Review of Radiographic Studies:    AP and lateral of the cervical spine, indication to evaluate pain, no prior comparison views or not available, shows no acute fracture or dislocation evident.  There is mild C5-C6 DDD/DJD.    Laboratory and Other Studies:  No new results reviewed today.     Medical Decision Making:    Limited progress with persistent or worsening symptoms.   Continue care plan with any additional work-up and treatment as noted.  Patient has left shoulder impingement and rotator cuff strain and partial tear noted on MRI.  She has signs of cervical strain as well, with imaging showing mild C5-6 DDD/DJD.  She does not have any radicular symptoms on exam today, though the tingling in her hand might represent some left carpal tunnel syndrome.  Recommended and prescribed a Medrol dose pack to try to quiet down symptoms that can be from both her cervical spine and left shoulder.  Night wrist splint is an options for the carpal tunnel signs. She will continue physical therapy as tolerated.  She will monitor her progress and call for any new concerns.  Will plan close follow-up in clinic in 4 weeks.  Patient was given a work status form and forms for short tern disability were prepared.    Procedures    Diagnoses and all orders for this visit:    1. Nontraumatic incomplete tear of left rotator cuff (Primary)  -     XR Spine Cervical 2 View  -     Discontinue: methylPREDNISolone (MEDROL) 4 MG dose pack; Use as directed by package instructions (Patient not taking: Reported on 11/8/2023)  Dispense: 21 tablet; Refill: 0  -     XR Spine Cervical 2 or 3 View; Future    2. Neck pain  -     XR Spine Cervical 2 View  -     XR Spine Cervical 2 or 3 View; Future    3. Numbness  and tingling of left hand       Discussion of orthopaedic goals and activities and patient expressed appreciation.  Risk, benefits, and merits of treatment alternatives reviewed with the patient and questions answered  Regular exercise as tolerated  Guided on proper techniques for mobility and conditioning exercises  Ice, heat, and/or modalities as beneficial  Reduced physical activity as appropriate and avoid offending activity  Physical therapy program ongoing  Home exercise program ongoing  Take prescribed medications as instructed only as tolerated    Recommendations/Plan:  Exercise, medications, injections, other patient advice, and return appointment as noted.  Brace: No brace was given at today's visit.  Referral: Physical and Occupational Therapy referral.  Test/Studies: No additional studies ordered at this time.  Work/Activity Status: Usual activities, routine exercise as tolerated, light physical work as tolerated, no strenuous activity.    Return in about 4 weeks (around 11/17/2023) for Recheck.  Patient is encouraged and agreeable to call or return sooner for any issues or concerns.

## 2023-10-27 ENCOUNTER — TELEPHONE (OUTPATIENT)
Dept: ORTHOPEDIC SURGERY | Facility: CLINIC | Age: 43
End: 2023-10-27
Payer: COMMERCIAL

## 2023-10-27 NOTE — TELEPHONE ENCOUNTER
Spoke to patient and she stated that the meds didn't help and wanted to proceed with surgery. Confirmed with Dr. Diaz that this was appropriate.

## 2023-10-27 NOTE — TELEPHONE ENCOUNTER
Provider: DR. KOO    Caller: MACK CASTILLO    Relationship to Patient: SELF    Phone Number: [phone]    Reason for Call:PATIENT CALLED STATING methylPREDNISolone DID NOT HELP HER PAIN. WANTS TO MOVE FORWARD WITH GETTING SURGERY. DX:Nontraumatic tear of left supraspinatus tendon. PLEASE ADVISE.

## 2023-11-08 ENCOUNTER — OFFICE VISIT (OUTPATIENT)
Dept: FAMILY MEDICINE CLINIC | Facility: CLINIC | Age: 43
End: 2023-11-08
Payer: COMMERCIAL

## 2023-11-08 VITALS
SYSTOLIC BLOOD PRESSURE: 110 MMHG | HEART RATE: 95 BPM | OXYGEN SATURATION: 99 % | DIASTOLIC BLOOD PRESSURE: 80 MMHG | TEMPERATURE: 98.1 F | HEIGHT: 60 IN | BODY MASS INDEX: 30.82 KG/M2 | WEIGHT: 157 LBS

## 2023-11-08 DIAGNOSIS — J02.9 ACUTE PHARYNGITIS, UNSPECIFIED ETIOLOGY: Primary | ICD-10-CM

## 2023-11-08 DIAGNOSIS — J02.9 SORE THROAT: ICD-10-CM

## 2023-11-08 DIAGNOSIS — R52 BODY ACHES: ICD-10-CM

## 2023-11-08 DIAGNOSIS — R51.9 NONINTRACTABLE HEADACHE, UNSPECIFIED CHRONICITY PATTERN, UNSPECIFIED HEADACHE TYPE: ICD-10-CM

## 2023-11-08 DIAGNOSIS — R50.9 FEVER, UNSPECIFIED FEVER CAUSE: ICD-10-CM

## 2023-11-08 LAB
EXPIRATION DATE: NORMAL
EXPIRATION DATE: NORMAL
FLUAV AG UPPER RESP QL IA.RAPID: NOT DETECTED
FLUBV AG UPPER RESP QL IA.RAPID: NOT DETECTED
INTERNAL CONTROL: NORMAL
INTERNAL CONTROL: NORMAL
Lab: NORMAL
Lab: NORMAL
S PYO AG THROAT QL: NEGATIVE
SARS-COV-2 AG UPPER RESP QL IA.RAPID: NOT DETECTED

## 2023-11-08 PROCEDURE — 99213 OFFICE O/P EST LOW 20 MIN: CPT | Performed by: FAMILY MEDICINE

## 2023-11-08 PROCEDURE — 87880 STREP A ASSAY W/OPTIC: CPT | Performed by: FAMILY MEDICINE

## 2023-11-08 PROCEDURE — 87428 SARSCOV & INF VIR A&B AG IA: CPT | Performed by: FAMILY MEDICINE

## 2023-11-08 RX ORDER — AMOXICILLIN 500 MG/1
500 CAPSULE ORAL 3 TIMES DAILY
Qty: 30 CAPSULE | Refills: 0 | Status: SHIPPED | OUTPATIENT
Start: 2023-11-08 | End: 2023-11-18

## 2023-11-08 RX ORDER — DEXAMETHASONE 2 MG/1
2 TABLET ORAL
Qty: 3 TABLET | Refills: 0 | Status: SHIPPED | OUTPATIENT
Start: 2023-11-08 | End: 2023-11-11

## 2023-11-08 NOTE — PROGRESS NOTES
Subjective   Juliann Benitez is a 42 y.o. female.     History of Present Illness  She c/o sore throat  Sore Throat   This is a new problem. The current episode started yesterday. The problem has been rapidly worsening. Neither side of throat is experiencing more pain than the other. Maximum temperature: subjective. The pain is severe. Associated symptoms include congestion (mild), headaches and trouble swallowing. Pertinent negatives include no coughing, diarrhea, ear pain, hoarse voice, neck pain, shortness of breath, stridor, swollen glands or vomiting. She has tried NSAIDs for the symptoms. The treatment provided no relief.     Daughter with similar illness starting few days ago. COVID/strep negative.    The following portions of the patient's history were reviewed and updated as appropriate: allergies, current medications, past family history, past medical history, past social history, past surgical history, and problem list.    Review of Systems   Constitutional:  Positive for fatigue and fever.   HENT:  Positive for congestion (mild), sore throat and trouble swallowing. Negative for ear pain, hoarse voice, mouth sores, nosebleeds and rhinorrhea.    Eyes:  Negative for pain, discharge and redness.   Respiratory:  Negative for cough, shortness of breath and stridor.    Cardiovascular:  Negative for chest pain.   Gastrointestinal:  Negative for diarrhea and vomiting.   Genitourinary:  Negative for dysuria and hematuria.   Musculoskeletal:  Positive for arthralgias and myalgias. Negative for neck pain.   Skin:  Negative for rash.   Neurological:  Positive for headaches.   Hematological:  Negative for adenopathy.       Objective   Vitals:    11/08/23 1006   BP: 110/80   Pulse: 95   Temp: 98.1 °F (36.7 °C)   SpO2: 99%     Body mass index is 30.66 kg/m².      11/08/23  1006   Weight: 71.2 kg (157 lb)       Physical Exam  Vitals and nursing note reviewed.   Constitutional:       General: She is not in acute distress.      Appearance: She is ill-appearing (mildly).   HENT:      Right Ear: Tympanic membrane, ear canal and external ear normal.      Left Ear: Tympanic membrane, ear canal and external ear normal.      Nose: Nose normal.      Mouth/Throat:      Mouth: Mucous membranes are moist. No oral lesions.      Pharynx: Posterior oropharyngeal erythema present. No oropharyngeal exudate.      Tonsils: 2+ on the right. 2+ on the left.   Eyes:      Conjunctiva/sclera: Conjunctivae normal.   Cardiovascular:      Rate and Rhythm: Normal rate and regular rhythm.      Pulses: Normal pulses.   Pulmonary:      Effort: Pulmonary effort is normal.      Breath sounds: Normal breath sounds.   Lymphadenopathy:      Cervical: No cervical adenopathy.   Skin:     General: Skin is warm and dry.      Findings: No rash.   Neurological:      Mental Status: She is alert and oriented to person, place, and time.   Psychiatric:         Behavior: Behavior normal. Behavior is cooperative.         Cognition and Memory: Cognition normal.       Recent Results (from the past 24 hour(s))   POCT SARS-CoV-2 Antigen RUFINO + Flu    Collection Time: 11/08/23 10:34 AM    Specimen: Swab   Result Value Ref Range    SARS Antigen Not Detected Not Detected, Presumptive Negative    Influenza A Antigen RUFINO Not Detected Not Detected    Influenza B Antigen RUFINO Not Detected Not Detected    Internal Control Passed Passed    Lot Number [id number]     Expiration Date 10/10/24    POCT rapid strep A    Collection Time: 11/08/23 10:38 AM    Specimen: Swab   Result Value Ref Range    Rapid Strep A Screen Negative Negative, VALID, INVALID, Not Performed    Internal Control Passed Passed    Lot Number [id number]     Expiration Date 12/16/25        Assessment & Plan   Diagnoses and all orders for this visit:    1. Acute pharyngitis, unspecified etiology (Primary)    2. Nonintractable headache, unspecified chronicity pattern, unspecified headache type  -     POCT SARS-CoV-2 Antigen RUFINO +  Flu    3. Body aches  -     POCT SARS-CoV-2 Antigen RUFINO + Flu    4. Fever, unspecified fever cause  -     POCT SARS-CoV-2 Antigen RUFINO + Flu    5. Sore throat  -     POCT rapid strep A    Other orders  -     dexAMETHasone (DECADRON) 2 MG tablet; Take 1 tablet by mouth Daily With Breakfast for 3 days.  Dispense: 3 tablet; Refill: 0  -     amoxicillin (AMOXIL) 500 MG capsule; Take 1 capsule by mouth 3 (Three) Times a Day for 10 days.  Dispense: 30 capsule; Refill: 0       Suspect viral pharyngitis, possible early COVID. Symptom mgnt reviewed. Dex given due to degree of throat pain, tonsillar enlargement. Retest for COVID in 48 hrs.    Delayed rx for amoxicillin to be taken if sore throat acutely worsen or are not resolved within 10 days of onset.    F/u per routine, sooner as needed.  Patient was encouraged to keep me informed of any acute changes, lack of improvement, or any new concerning symptoms.  Pt is aware of reasons to seek emergent care.  Patient voiced understanding of all instructions and denied further questions.    Please note that portions of this note may have been completed with a voice recognition program.

## 2023-11-17 ENCOUNTER — OFFICE VISIT (OUTPATIENT)
Dept: ORTHOPEDIC SURGERY | Facility: CLINIC | Age: 43
End: 2023-11-17
Payer: COMMERCIAL

## 2023-11-17 ENCOUNTER — PREP FOR SURGERY (OUTPATIENT)
Dept: OTHER | Facility: HOSPITAL | Age: 43
End: 2023-11-17
Payer: COMMERCIAL

## 2023-11-17 VITALS
DIASTOLIC BLOOD PRESSURE: 83 MMHG | BODY MASS INDEX: 31.14 KG/M2 | WEIGHT: 158.6 LBS | TEMPERATURE: 98.6 F | SYSTOLIC BLOOD PRESSURE: 128 MMHG | HEIGHT: 60 IN | HEART RATE: 69 BPM

## 2023-11-17 DIAGNOSIS — R20.2 NUMBNESS AND TINGLING OF LEFT HAND: ICD-10-CM

## 2023-11-17 DIAGNOSIS — M75.42 IMPINGEMENT SYNDROME OF LEFT SHOULDER: ICD-10-CM

## 2023-11-17 DIAGNOSIS — G89.29 CHRONIC LEFT SHOULDER PAIN: ICD-10-CM

## 2023-11-17 DIAGNOSIS — M54.2 NECK PAIN: ICD-10-CM

## 2023-11-17 DIAGNOSIS — M25.512 CHRONIC LEFT SHOULDER PAIN: ICD-10-CM

## 2023-11-17 DIAGNOSIS — M75.112 NONTRAUMATIC INCOMPLETE TEAR OF LEFT ROTATOR CUFF: ICD-10-CM

## 2023-11-17 DIAGNOSIS — M75.42 IMPINGEMENT SYNDROME OF LEFT SHOULDER: Primary | ICD-10-CM

## 2023-11-17 DIAGNOSIS — M75.112 NONTRAUMATIC INCOMPLETE TEAR OF LEFT ROTATOR CUFF: Primary | ICD-10-CM

## 2023-11-17 DIAGNOSIS — M25.512 ARTHRALGIA OF LEFT SHOULDER REGION: ICD-10-CM

## 2023-11-17 DIAGNOSIS — R20.0 NUMBNESS AND TINGLING OF LEFT HAND: ICD-10-CM

## 2023-11-17 RX ORDER — FAMOTIDINE 10 MG/ML
20 INJECTION, SOLUTION INTRAVENOUS
OUTPATIENT
Start: 2023-11-18 | End: 2023-11-19

## 2023-11-17 RX ORDER — CEFAZOLIN SODIUM 2 G/50ML
2 SOLUTION INTRAVENOUS
OUTPATIENT
Start: 2023-11-18 | End: 2023-11-19

## 2023-11-17 NOTE — PROGRESS NOTES
Juliann Benitez is a 42 y.o. female is here today for a discussion of treatment plans, surgery, and rehabilitation.  Pre-op Exam of the Left Shoulder (Diagnostic arthroscopy tentatively scheduled 2023)      CHIEF COMPLAINT:    Pre-operative evaluation with history and physical exam    History of Present Illness      42 year old  at [facility] that returns today with continued left shoulder impingement syndrome and partial rotator cuff tear.  She would like to proceed with left shoulder arthroscopy, decompression and rotator cuff repair if warranted.  She also has some cervical spine symptoms and radiographs show mild C5-6 DDD.  In addition she has left hand numbness suggestive of possible carpal tunnel syndrome.  We reviewed the option of EMG nerve conduction study and for now she prefers to defer this work up or any further treatment for carpal tunnel syndrome.      Based on her history, symptoms and exam, the predominance of her pain and limitations are due to the left shoulder condition.  She has had treatments including physical therapy exercises with modalities, injection and medications including ibuprofen and a Medrol dose pack with limited or temporary relief.  As a next step for her shoulder condition, she wants to proceed with elective surgery.     PAST MEDICAL HISTORY:    Past Medical History:   Diagnosis Date    Alpha-1-antitrypsin deficiency     Heart murmur     Kidney infection     Kidney stone     Pneumonia 9/10    Pregnancy     A1 s/p  x 2    Tattoo     X5    Ureteral reflux     PT UNSURE ABOUT THIS IN THE PAST       Past Surgical History:   Procedure Laterality Date    AUGMENTATION MAMMAPLASTY      BREAST AUGMENTATION      D & C HYSTEROSCOPY ENDOMETRIAL ABLATION N/A 3/4/2019    Procedure: DILATATION AND CURETTAGE HYSTEROSCOPY NOVASURE ENDOMETRIAL ABLATION;  Surgeon: Paxton Amos MD;  Location: [facility];  Service: Obstetrics/Gynecology    DILATATION AND CURETTAGE       ENDOMETRIAL ABLATION      EXPLORATORY LAPAROTOMY      STATES CYSTS REMOVED FROM OVARIES    HYSTEROSCOPY         Family History   Problem Relation Age of Onset    Pancreatic cancer Mother     Heart attack Mother 50    Diabetes Mother     Prostate cancer Father     Hypertension Father     Diabetes Maternal Grandmother     Breast cancer Neg Hx        Social History     Socioeconomic History    Marital status:    Tobacco Use    Smoking status: Former     Packs/day: 1.00     Years: 15.00     Additional pack years: 0.00     Total pack years: 15.00     Types: Cigarettes     Start date: 2004     Quit date: 2019     Years since quittin.4     Passive exposure: Past    Smokeless tobacco: Never   Vaping Use    Vaping Use: Never used   Substance and Sexual Activity    Alcohol use: Not Currently     Comment: very rarely    Drug use: No    Sexual activity: Yes          Current Outpatient Medications:     albuterol sulfate HFA (Ventolin HFA) 108 (90 Base) MCG/ACT inhaler, Inhale 2 puffs Every 4 (Four) Hours As Needed for Wheezing or Shortness of Air., Disp: 18 g, Rfl: 3    budesonide-formoterol (Symbicort) 160-4.5 MCG/ACT inhaler, Inhale 2 puffs 2 (Two) Times a Day. Rinse mouth with water after use., Disp: 10 g, Rfl: 5    multivitamin with minerals tablet tablet, Take 1 tablet by mouth Daily. Womens once daily, Disp: , Rfl:     Current Facility-Administered Medications:     lidocaine (XYLOCAINE) 1 % injection 8 mL, 8 mL, Infiltration, Once, Lauren Gilliland MD    triamcinolone acetonide (KENALOG-40) injection 40 mg, 40 mg, Intra-articular, Once, Lauren Gilliland MD    No Known Allergies    Review of Systems   Constitutional:  Negative for fever.   HENT:  Negative for voice change.    Eyes:  Negative for redness.   Respiratory:  Negative for shortness of breath.    Cardiovascular:  Negative for chest pain.   Gastrointestinal:  Negative for abdominal distention.   Genitourinary:  Negative for dysuria.  "  Musculoskeletal:  Positive for arthralgias and neck pain. Negative for gait problem and joint swelling.   Skin:  Negative for color change and rash.   Neurological:  Positive for weakness (left shoulder). Negative for speech difficulty.   Psychiatric/Behavioral:  Negative for confusion.      I have reviewed the medical and surgical history, family history, social history, medications, and/or allergies, and the review of systems of this report.    PHYSICAL EXAMINATION:    /83 (BP Location: Left arm, Patient Position: Sitting, Cuff Size: Adult)   Pulse 69   Temp 98.6 °F (37 °C)   Ht 152.4 cm (60\")   Wt 71.9 kg (158 lb 9.6 oz)   BMI 30.97 kg/m²     GENERAL [x] Well developed  []Ill appearing [x] No acute distress    HEENT [x]No acute changes [x] Normocephalic, atraumatic    NECK [x]Supple  [] No midline tenderness    LUNGS [x]Clear bilaterally [x]No wheezes []Rhonchi [] Rales    HEART [x] Regular rate  [x] Regular rhythm [] Irregular    ABDOMEN [x] Soft [x] Not tender [x] Not distended [x] Normal sounds    VAS/EXT [x] Normal Pulses []Edema []Cyanosis             SKIN [x] Warm [x]Dry []Pink []Ecchymosis []Cool    NEURO [x] Sensation Intact [x] Motor Intact [x] Pulse intact    MUSCULOSKELETAL [x]  Left shoulder diffuse pain, elevation 110 degrees, external rotation 60 degrees, internal rotation to L3.  MMT abduction 4/5, internal rotation 5/5, external rotation 4/5, biceps 4/5, supraspinatus 3/5.  Impingement positive, drop arm sign mild positive, apprehension sign negative.  No erythema.  No joint effusion.      Physical Exam  Ortho Exam   Neurologic Exam    Assessment & Plan     Independent Review of Radiographic Studies:    No new imaging done today.    Laboratory and Other Studies:  Pre-op labs and studies.     Medical Decision Making:    Limited progress with persistent or worsening symptoms.   Continue care plan with work-up and treatment as noted.  Elective surgical treatment of chronic shoulder " condition.  Medications as prescribed and only as tolerated.  Physical and occupational therapy planned.  Decision for surgery and patient counseling as noted in report.  Activity restrictions as appropriate.      Diagnoses and all orders for this visit:    1. Impingement syndrome of left shoulder (Primary)    2. Arthralgia of left shoulder region    3. Nontraumatic incomplete tear of left rotator cuff    4. Neck pain    5. Numbness and tingling of left hand        Assessment/Plan:    *SPECIAL INSTRUCTIONS:      Multi-modal analgesia.    Rehabilitation PT/OT planned.    Discussion of orthopaedic goals and activities and patient expressed appreciation.  Risk, benefits, and merits of treatment alternatives reviewed with the patient and questions answered  Regular exercise as tolerated  The nature of the proposed surgery reviewed with patient including risks, benefits, rehabilitation, recovery time, and outcome expectations  Ice, heat, and/or modalities as beneficial  Reduced physical activity as appropriate and avoid offending activity  Physical therapy program planned  Take prescribed medications as instructed only as tolerated  Short term disability forms prepared for patient.      Risks, benefits, and alternative treatments discussed with the patient: [x] Yes [] No    Risk benefits and merits of the proposed surgery were discussed and the patient's questions were answered risks discussed including and not limited to:  Anesthesia reactions  Infection  Deep venous thrombosis and pulmonary embolus  Nerve, vascular or tendon injury  Fracture  Deformity  Stiffness  Weakness  Prosthesis and implant issues such as loosening or material wear  Skin necrosis  Revision surgery or further treatment  Recurrence of problem and condition     Informed consent: [] Signed  [x] To be obtained at hospital  [] Both    Recommendations/Plan:    Exercise, medications, injections, other patient advice, and return appointment as  noted.  Brace: No brace was given at today's visit.  Referral: No referrals made at today's visit.  Test/Studies: Pre-op labs and tests.  Surgery: Surgery proposed at this visit as noted.  Work/Activity Status: Usual activities, routine exercise as tolerated, light physical work as tolerated, no strenuous activity.    PLANNED SURGICAL PROCEDURE:  Left shoulder diagnostic arthroscopy, subacromial decompression and possible mini-open rotator cuff repair.    Return in about 4 weeks (around 12/15/2023) for Post-op, recheck.  Patient is encouraged and agreeable to call or return sooner for any issues or concerns.

## 2023-11-17 NOTE — H&P (VIEW-ONLY)
Juliann Benitez is a 42 y.o. female is here today for a discussion of treatment plans, surgery, and rehabilitation.  Pre-op Exam of the Left Shoulder (Diagnostic arthroscopy tentatively scheduled 2023)      CHIEF COMPLAINT:    Pre-operative evaluation with history and physical exam    History of Present Illness      42 year old  at [facility] [employer] that returns today with continued left shoulder impingement syndrome and partial rotator cuff tear.  She would like to proceed with left shoulder arthroscopy, decompression and rotator cuff repair if warranted.  She also has some cervical spine symptoms and radiographs show mild C5-6 DDD.  In addition she has left hand numbness suggestive of possible carpal tunnel syndrome.  We reviewed the option of EMG nerve conduction study and for now she prefers to defer this work up or any further treatment for carpal tunnel syndrome.      Based on her history, symptoms and exam, the predominance of her pain and limitations are due to the left shoulder condition.  She has had treatments including physical therapy exercises with modalities, injection and medications including ibuprofen and a Medrol dose pack with limited or temporary relief.  As a next step for her shoulder condition, she wants to proceed with elective surgery.     PAST MEDICAL HISTORY:    Past Medical History:   Diagnosis Date    Alpha-1-antitrypsin deficiency     Heart murmur     Kidney infection     Kidney stone     Pneumonia 9/10    Pregnancy     A1 s/p  x 2    Tattoo     X5    Ureteral reflux     PT UNSURE ABOUT THIS IN THE PAST       Past Surgical History:   Procedure Laterality Date    AUGMENTATION MAMMAPLASTY      BREAST AUGMENTATION      D & C HYSTEROSCOPY ENDOMETRIAL ABLATION N/A 3/4/2019    Procedure: DILATATION AND CURETTAGE HYSTEROSCOPY NOVASURE ENDOMETRIAL ABLATION;  Surgeon: Paxton Amos MD;  Location: [facility];  Service: Obstetrics/Gynecology    DILATATION AND CURETTAGE       ENDOMETRIAL ABLATION      EXPLORATORY LAPAROTOMY      STATES CYSTS REMOVED FROM OVARIES    HYSTEROSCOPY         Family History   Problem Relation Age of Onset    Pancreatic cancer Mother     Heart attack Mother 50    Diabetes Mother     Prostate cancer Father     Hypertension Father     Diabetes Maternal Grandmother     Breast cancer Neg Hx        Social History     Socioeconomic History    Marital status:    Tobacco Use    Smoking status: Former     Packs/day: 1.00     Years: 15.00     Additional pack years: 0.00     Total pack years: 15.00     Types: Cigarettes     Start date: 2004     Quit date: 2019     Years since quittin.4     Passive exposure: Past    Smokeless tobacco: Never   Vaping Use    Vaping Use: Never used   Substance and Sexual Activity    Alcohol use: Not Currently     Comment: very rarely    Drug use: No    Sexual activity: Yes          Current Outpatient Medications:     albuterol sulfate HFA (Ventolin HFA) 108 (90 Base) MCG/ACT inhaler, Inhale 2 puffs Every 4 (Four) Hours As Needed for Wheezing or Shortness of Air., Disp: 18 g, Rfl: 3    budesonide-formoterol (Symbicort) 160-4.5 MCG/ACT inhaler, Inhale 2 puffs 2 (Two) Times a Day. Rinse mouth with water after use., Disp: 10 g, Rfl: 5    multivitamin with minerals tablet tablet, Take 1 tablet by mouth Daily. Womens once daily, Disp: , Rfl:     Current Facility-Administered Medications:     lidocaine (XYLOCAINE) 1 % injection 8 mL, 8 mL, Infiltration, Once, Lauren Gilliland MD    triamcinolone acetonide (KENALOG-40) injection 40 mg, 40 mg, Intra-articular, Once, Lauren Gilliland MD    No Known Allergies    Review of Systems   Constitutional:  Negative for fever.   HENT:  Negative for voice change.    Eyes:  Negative for redness.   Respiratory:  Negative for shortness of breath.    Cardiovascular:  Negative for chest pain.   Gastrointestinal:  Negative for abdominal distention.   Genitourinary:  Negative for dysuria.  "  Musculoskeletal:  Positive for arthralgias and neck pain. Negative for gait problem and joint swelling.   Skin:  Negative for color change and rash.   Neurological:  Positive for weakness (left shoulder). Negative for speech difficulty.   Psychiatric/Behavioral:  Negative for confusion.      I have reviewed the medical and surgical history, family history, social history, medications, and/or allergies, and the review of systems of this report.    PHYSICAL EXAMINATION:    /83 (BP Location: Left arm, Patient Position: Sitting, Cuff Size: Adult)   Pulse 69   Temp 98.6 °F (37 °C)   Ht 152.4 cm (60\")   Wt 71.9 kg (158 lb 9.6 oz)   BMI 30.97 kg/m²     GENERAL [x] Well developed  []Ill appearing [x] No acute distress    HEENT [x]No acute changes [x] Normocephalic, atraumatic    NECK [x]Supple  [] No midline tenderness    LUNGS [x]Clear bilaterally [x]No wheezes []Rhonchi [] Rales    HEART [x] Regular rate  [x] Regular rhythm [] Irregular    ABDOMEN [x] Soft [x] Not tender [x] Not distended [x] Normal sounds    VAS/EXT [x] Normal Pulses []Edema []Cyanosis             SKIN [x] Warm [x]Dry []Pink []Ecchymosis []Cool    NEURO [x] Sensation Intact [x] Motor Intact [x] Pulse intact    MUSCULOSKELETAL [x]  Left shoulder diffuse pain, elevation 110 degrees, external rotation 60 degrees, internal rotation to L3.  MMT abduction 4/5, internal rotation 5/5, external rotation 4/5, biceps 4/5, supraspinatus 3/5.  Impingement positive, drop arm sign mild positive, apprehension sign negative.  No erythema.  No joint effusion.      Physical Exam  Ortho Exam   Neurologic Exam    Assessment & Plan     Independent Review of Radiographic Studies:    No new imaging done today.    Laboratory and Other Studies:  Pre-op labs and studies.     Medical Decision Making:    Limited progress with persistent or worsening symptoms.   Continue care plan with work-up and treatment as noted.  Elective surgical treatment of chronic shoulder " condition.  Medications as prescribed and only as tolerated.  Physical and occupational therapy planned.  Decision for surgery and patient counseling as noted in report.  Activity restrictions as appropriate.      Diagnoses and all orders for this visit:    1. Impingement syndrome of left shoulder (Primary)    2. Arthralgia of left shoulder region    3. Nontraumatic incomplete tear of left rotator cuff    4. Neck pain    5. Numbness and tingling of left hand        Assessment/Plan:    *SPECIAL INSTRUCTIONS:      Multi-modal analgesia.    Rehabilitation PT/OT planned.    Discussion of orthopaedic goals and activities and patient expressed appreciation.  Risk, benefits, and merits of treatment alternatives reviewed with the patient and questions answered  Regular exercise as tolerated  The nature of the proposed surgery reviewed with patient including risks, benefits, rehabilitation, recovery time, and outcome expectations  Ice, heat, and/or modalities as beneficial  Reduced physical activity as appropriate and avoid offending activity  Physical therapy program planned  Take prescribed medications as instructed only as tolerated  Short term disability forms prepared for patient.      Risks, benefits, and alternative treatments discussed with the patient: [x] Yes [] No    Risk benefits and merits of the proposed surgery were discussed and the patient's questions were answered risks discussed including and not limited to:  Anesthesia reactions  Infection  Deep venous thrombosis and pulmonary embolus  Nerve, vascular or tendon injury  Fracture  Deformity  Stiffness  Weakness  Prosthesis and implant issues such as loosening or material wear  Skin necrosis  Revision surgery or further treatment  Recurrence of problem and condition     Informed consent: [] Signed  [x] To be obtained at hospital  [] Both    Recommendations/Plan:    Exercise, medications, injections, other patient advice, and return appointment as  noted.  Brace: No brace was given at today's visit.  Referral: No referrals made at today's visit.  Test/Studies: Pre-op labs and tests.  Surgery: Surgery proposed at this visit as noted.  Work/Activity Status: Usual activities, routine exercise as tolerated, light physical work as tolerated, no strenuous activity.    PLANNED SURGICAL PROCEDURE:  Left shoulder diagnostic arthroscopy, subacromial decompression and possible mini-open rotator cuff repair.    Return in about 4 weeks (around 12/15/2023) for Post-op, recheck.  Patient is encouraged and agreeable to call or return sooner for any issues or concerns.

## 2023-11-21 PROBLEM — M75.42 IMPINGEMENT SYNDROME OF LEFT SHOULDER: Status: ACTIVE | Noted: 2023-11-17

## 2023-11-21 PROBLEM — M25.512 CHRONIC LEFT SHOULDER PAIN: Status: ACTIVE | Noted: 2023-11-17

## 2023-11-21 PROBLEM — G89.29 CHRONIC LEFT SHOULDER PAIN: Status: ACTIVE | Noted: 2023-11-17

## 2023-11-22 ENCOUNTER — TELEPHONE (OUTPATIENT)
Dept: PREADMISSION TESTING | Facility: HOSPITAL | Age: 43
End: 2023-11-22

## 2023-11-27 ENCOUNTER — PRE-ADMISSION TESTING (OUTPATIENT)
Dept: PREADMISSION TESTING | Facility: HOSPITAL | Age: 43
End: 2023-11-27
Payer: COMMERCIAL

## 2023-11-27 ENCOUNTER — HOSPITAL ENCOUNTER (OUTPATIENT)
Dept: GENERAL RADIOLOGY | Facility: HOSPITAL | Age: 43
Discharge: HOME | End: 2023-11-27
Payer: COMMERCIAL

## 2023-11-27 VITALS — WEIGHT: 159.8 LBS | BODY MASS INDEX: 31.37 KG/M2 | HEIGHT: 60 IN

## 2023-11-27 DIAGNOSIS — M75.112 NONTRAUMATIC INCOMPLETE TEAR OF LEFT ROTATOR CUFF: ICD-10-CM

## 2023-11-27 DIAGNOSIS — G89.29 CHRONIC LEFT SHOULDER PAIN: ICD-10-CM

## 2023-11-27 DIAGNOSIS — M75.42 IMPINGEMENT SYNDROME OF LEFT SHOULDER: ICD-10-CM

## 2023-11-27 DIAGNOSIS — M25.512 CHRONIC LEFT SHOULDER PAIN: ICD-10-CM

## 2023-11-27 LAB
ALBUMIN SERPL-MCNC: 4.3 G/DL (ref 3.5–5.2)
ALBUMIN/GLOB SERPL: 1.6 G/DL
ALP SERPL-CCNC: 58 U/L (ref 39–117)
ALT SERPL W P-5'-P-CCNC: 11 U/L (ref 1–33)
ANION GAP SERPL CALCULATED.3IONS-SCNC: 8.2 MMOL/L (ref 5–15)
AST SERPL-CCNC: 20 U/L (ref 1–32)
B-HCG UR QL: NEGATIVE
BASOPHILS # BLD AUTO: 0.1 10*3/MM3 (ref 0–0.2)
BASOPHILS NFR BLD AUTO: 1.6 % (ref 0–1.5)
BILIRUB SERPL-MCNC: 0.3 MG/DL (ref 0–1.2)
BILIRUB UR QL STRIP: NEGATIVE
BUN SERPL-MCNC: 9 MG/DL (ref 6–20)
BUN/CREAT SERPL: 13.8 (ref 7–25)
CALCIUM SPEC-SCNC: 9.3 MG/DL (ref 8.6–10.5)
CHLORIDE SERPL-SCNC: 103 MMOL/L (ref 98–107)
CLARITY UR: CLEAR
CO2 SERPL-SCNC: 26.8 MMOL/L (ref 22–29)
COLOR UR: YELLOW
CREAT SERPL-MCNC: 0.65 MG/DL (ref 0.57–1)
DEPRECATED RDW RBC AUTO: 44.1 FL (ref 37–54)
EGFRCR SERPLBLD CKD-EPI 2021: 113.6 ML/MIN/1.73
EOSINOPHIL # BLD AUTO: 0.14 10*3/MM3 (ref 0–0.4)
EOSINOPHIL NFR BLD AUTO: 2.3 % (ref 0.3–6.2)
ERYTHROCYTE [DISTWIDTH] IN BLOOD BY AUTOMATED COUNT: 13.1 % (ref 12.3–15.4)
GLOBULIN UR ELPH-MCNC: 2.7 GM/DL
GLUCOSE SERPL-MCNC: 96 MG/DL (ref 65–99)
GLUCOSE UR STRIP-MCNC: NEGATIVE MG/DL
HCT VFR BLD AUTO: 43 % (ref 34–46.6)
HGB BLD-MCNC: 14.6 G/DL (ref 12–15.9)
HGB UR QL STRIP.AUTO: NEGATIVE
IMM GRANULOCYTES # BLD AUTO: 0.01 10*3/MM3 (ref 0–0.05)
IMM GRANULOCYTES NFR BLD AUTO: 0.2 % (ref 0–0.5)
KETONES UR QL STRIP: NEGATIVE
LEUKOCYTE ESTERASE UR QL STRIP.AUTO: NEGATIVE
LYMPHOCYTES # BLD AUTO: 2.23 10*3/MM3 (ref 0.7–3.1)
LYMPHOCYTES NFR BLD AUTO: 36.4 % (ref 19.6–45.3)
MCH RBC QN AUTO: 30.9 PG (ref 26.6–33)
MCHC RBC AUTO-ENTMCNC: 34 G/DL (ref 31.5–35.7)
MCV RBC AUTO: 91.1 FL (ref 79–97)
MONOCYTES # BLD AUTO: 0.37 10*3/MM3 (ref 0.1–0.9)
MONOCYTES NFR BLD AUTO: 6 % (ref 5–12)
NEUTROPHILS NFR BLD AUTO: 3.27 10*3/MM3 (ref 1.7–7)
NEUTROPHILS NFR BLD AUTO: 53.5 % (ref 42.7–76)
NITRITE UR QL STRIP: NEGATIVE
NRBC BLD AUTO-RTO: 0 /100 WBC (ref 0–0.2)
PH UR STRIP.AUTO: 7 [PH] (ref 5–8)
PLATELET # BLD AUTO: 363 10*3/MM3 (ref 140–450)
PMV BLD AUTO: 9.4 FL (ref 6–12)
POTASSIUM SERPL-SCNC: 4.2 MMOL/L (ref 3.5–5.2)
PROT SERPL-MCNC: 7 G/DL (ref 6–8.5)
PROT UR QL STRIP: NEGATIVE
QT INTERVAL: 432 MS
QTC INTERVAL: 452 MS
RBC # BLD AUTO: 4.72 10*6/MM3 (ref 3.77–5.28)
SODIUM SERPL-SCNC: 138 MMOL/L (ref 136–145)
SP GR UR STRIP: <=1.005 (ref 1–1.03)
UROBILINOGEN UR QL STRIP: NORMAL
WBC NRBC COR # BLD AUTO: 6.12 10*3/MM3 (ref 3.4–10.8)

## 2023-11-27 PROCEDURE — 93005 ELECTROCARDIOGRAM TRACING: CPT

## 2023-11-27 PROCEDURE — 80053 COMPREHEN METABOLIC PANEL: CPT

## 2023-11-27 PROCEDURE — 81025 URINE PREGNANCY TEST: CPT

## 2023-11-27 PROCEDURE — 93010 ELECTROCARDIOGRAM REPORT: CPT | Performed by: INTERNAL MEDICINE

## 2023-11-27 PROCEDURE — 85025 COMPLETE CBC W/AUTO DIFF WBC: CPT

## 2023-11-27 PROCEDURE — 36415 COLL VENOUS BLD VENIPUNCTURE: CPT

## 2023-11-27 PROCEDURE — 87081 CULTURE SCREEN ONLY: CPT

## 2023-11-27 PROCEDURE — 81003 URINALYSIS AUTO W/O SCOPE: CPT

## 2023-11-27 PROCEDURE — 71046 X-RAY EXAM CHEST 2 VIEWS: CPT

## 2023-11-28 LAB — MRSA SPEC QL CULT: NORMAL

## 2023-11-29 DIAGNOSIS — M75.112 NONTRAUMATIC INCOMPLETE TEAR OF LEFT ROTATOR CUFF: ICD-10-CM

## 2023-11-29 DIAGNOSIS — M25.512 ARTHRALGIA OF LEFT SHOULDER REGION: ICD-10-CM

## 2023-11-29 DIAGNOSIS — M75.42 IMPINGEMENT SYNDROME OF LEFT SHOULDER: Primary | ICD-10-CM

## 2023-11-29 RX ORDER — HYDROCODONE BITARTRATE AND ACETAMINOPHEN 7.5; 325 MG/1; MG/1
1 TABLET ORAL EVERY 8 HOURS PRN
Qty: 21 TABLET | Refills: 0 | Status: SHIPPED | OUTPATIENT
Start: 2023-11-29

## 2023-11-30 ENCOUNTER — HOSPITAL ENCOUNTER (OUTPATIENT)
Facility: HOSPITAL | Age: 43
Setting detail: HOSPITAL OUTPATIENT SURGERY
Discharge: HOME | End: 2023-11-30
Attending: ORTHOPAEDIC SURGERY | Admitting: ORTHOPAEDIC SURGERY
Payer: COMMERCIAL

## 2023-11-30 ENCOUNTER — ANESTHESIA (OUTPATIENT)
Dept: PERIOP | Facility: HOSPITAL | Age: 43
End: 2023-11-30
Payer: COMMERCIAL

## 2023-11-30 ENCOUNTER — ANESTHESIA EVENT (OUTPATIENT)
Dept: PERIOP | Facility: HOSPITAL | Age: 43
End: 2023-11-30
Payer: COMMERCIAL

## 2023-11-30 ENCOUNTER — ANESTHESIA EVENT CONVERTED (OUTPATIENT)
Dept: ANESTHESIOLOGY | Facility: HOSPITAL | Age: 43
End: 2023-11-30
Payer: COMMERCIAL

## 2023-11-30 VITALS
RESPIRATION RATE: 15 BRPM | SYSTOLIC BLOOD PRESSURE: 118 MMHG | OXYGEN SATURATION: 97 % | DIASTOLIC BLOOD PRESSURE: 80 MMHG | TEMPERATURE: 97.2 F | HEART RATE: 77 BPM

## 2023-11-30 DIAGNOSIS — G89.29 CHRONIC LEFT SHOULDER PAIN: ICD-10-CM

## 2023-11-30 DIAGNOSIS — M75.42 IMPINGEMENT SYNDROME OF LEFT SHOULDER: ICD-10-CM

## 2023-11-30 DIAGNOSIS — M75.112 NONTRAUMATIC INCOMPLETE TEAR OF LEFT ROTATOR CUFF: ICD-10-CM

## 2023-11-30 DIAGNOSIS — M25.512 CHRONIC LEFT SHOULDER PAIN: ICD-10-CM

## 2023-11-30 PROCEDURE — 25010000002 CEFAZOLIN SODIUM-DEXTROSE 2-3 GM-%(50ML) RECONSTITUTED SOLUTION: Performed by: ORTHOPAEDIC SURGERY

## 2023-11-30 PROCEDURE — 25010000002 MIDAZOLAM PER 1MG: Performed by: NURSE ANESTHETIST, CERTIFIED REGISTERED

## 2023-11-30 PROCEDURE — 25010000002 DEXAMETHASONE PER 1 MG: Performed by: NURSE ANESTHETIST, CERTIFIED REGISTERED

## 2023-11-30 PROCEDURE — 25010000002 HYDROMORPHONE 1 MG/ML SOLUTION: Performed by: NURSE ANESTHETIST, CERTIFIED REGISTERED

## 2023-11-30 PROCEDURE — 25010000002 FENTANYL CITRATE PF 50 MCG/ML SOLUTION PREFILLED SYRINGE: Performed by: NURSE ANESTHETIST, CERTIFIED REGISTERED

## 2023-11-30 PROCEDURE — 25010000002 EPINEPHRINE PER 0.1 MG: Performed by: ORTHOPAEDIC SURGERY

## 2023-11-30 PROCEDURE — 25810000003 LACTATED RINGERS PER 1000 ML: Performed by: ORTHOPAEDIC SURGERY

## 2023-11-30 PROCEDURE — 25010000002 PROPOFOL 200 MG/20ML EMULSION: Performed by: NURSE ANESTHETIST, CERTIFIED REGISTERED

## 2023-11-30 PROCEDURE — 25010000002 ONDANSETRON PER 1 MG: Performed by: NURSE ANESTHETIST, CERTIFIED REGISTERED

## 2023-11-30 PROCEDURE — 25010000002 SUGAMMADEX 200 MG/2ML SOLUTION: Performed by: NURSE ANESTHETIST, CERTIFIED REGISTERED

## 2023-11-30 PROCEDURE — 25010000002 DEXAMETHASONE SODIUM PHOSPHATE 10 MG/ML SOLUTION: Performed by: NURSE ANESTHETIST, CERTIFIED REGISTERED

## 2023-11-30 PROCEDURE — 25010000002 BUPIVACAINE (PF) 0.5 % SOLUTION: Performed by: NURSE ANESTHETIST, CERTIFIED REGISTERED

## 2023-11-30 RX ORDER — DEXAMETHASONE SODIUM PHOSPHATE 4 MG/ML
INJECTION, SOLUTION INTRA-ARTICULAR; INTRALESIONAL; INTRAMUSCULAR; INTRAVENOUS; SOFT TISSUE AS NEEDED
Status: DISCONTINUED | OUTPATIENT
Start: 2023-11-30 | End: 2023-11-30 | Stop reason: SURG

## 2023-11-30 RX ORDER — FAMOTIDINE 10 MG/ML
20 INJECTION, SOLUTION INTRAVENOUS
Status: COMPLETED | OUTPATIENT
Start: 2023-11-30 | End: 2023-11-30

## 2023-11-30 RX ORDER — ONDANSETRON 2 MG/ML
4 INJECTION INTRAMUSCULAR; INTRAVENOUS ONCE AS NEEDED
Status: DISCONTINUED | OUTPATIENT
Start: 2023-11-30 | End: 2023-11-30 | Stop reason: HOSPADM

## 2023-11-30 RX ORDER — DEXAMETHASONE SODIUM PHOSPHATE 10 MG/ML
INJECTION, SOLUTION INTRAMUSCULAR; INTRAVENOUS AS NEEDED
Status: DISCONTINUED | OUTPATIENT
Start: 2023-11-30 | End: 2023-11-30 | Stop reason: SURG

## 2023-11-30 RX ORDER — BUPIVACAINE HYDROCHLORIDE 5 MG/ML
INJECTION, SOLUTION EPIDURAL; INTRACAUDAL
Status: COMPLETED | OUTPATIENT
Start: 2023-11-30 | End: 2023-11-30

## 2023-11-30 RX ORDER — LIDOCAINE HCL/PF 100 MG/5ML
SYRINGE (ML) INJECTION AS NEEDED
Status: DISCONTINUED | OUTPATIENT
Start: 2023-11-30 | End: 2023-11-30 | Stop reason: SURG

## 2023-11-30 RX ORDER — BUPIVACAINE HCL/0.9 % NACL/PF 0.125 %
4-14 PREFILLED PUMP RESERVOIR EPIDURAL CONTINUOUS
Status: DISCONTINUED | OUTPATIENT
Start: 2023-11-30 | End: 2023-11-30 | Stop reason: HOSPADM

## 2023-11-30 RX ORDER — ONDANSETRON 2 MG/ML
INJECTION INTRAMUSCULAR; INTRAVENOUS AS NEEDED
Status: DISCONTINUED | OUTPATIENT
Start: 2023-11-30 | End: 2023-11-30 | Stop reason: SURG

## 2023-11-30 RX ORDER — LIDOCAINE HYDROCHLORIDE AND EPINEPHRINE 10; 10 MG/ML; UG/ML
INJECTION, SOLUTION INFILTRATION; PERINEURAL AS NEEDED
Status: DISCONTINUED | OUTPATIENT
Start: 2023-11-30 | End: 2023-11-30 | Stop reason: HOSPADM

## 2023-11-30 RX ORDER — FENTANYL CITRATE 50 UG/ML
INJECTION, SOLUTION INTRAMUSCULAR; INTRAVENOUS AS NEEDED
Status: DISCONTINUED | OUTPATIENT
Start: 2023-11-30 | End: 2023-11-30 | Stop reason: SURG

## 2023-11-30 RX ORDER — IPRATROPIUM BROMIDE AND ALBUTEROL SULFATE 2.5; .5 MG/3ML; MG/3ML
3 SOLUTION RESPIRATORY (INHALATION) ONCE
Status: DISCONTINUED | OUTPATIENT
Start: 2023-11-30 | End: 2023-11-30 | Stop reason: HOSPADM

## 2023-11-30 RX ORDER — KETAMINE HCL IN NACL, ISO-OSM 100MG/10ML
SYRINGE (ML) INJECTION AS NEEDED
Status: DISCONTINUED | OUTPATIENT
Start: 2023-11-30 | End: 2023-11-30 | Stop reason: SURG

## 2023-11-30 RX ORDER — PROMETHAZINE HYDROCHLORIDE 25 MG/1
25 TABLET ORAL EVERY 12 HOURS PRN
Qty: 20 TABLET | Refills: 0 | Status: SHIPPED | OUTPATIENT
Start: 2023-11-30

## 2023-11-30 RX ORDER — MIDAZOLAM HYDROCHLORIDE 2 MG/2ML
INJECTION, SOLUTION INTRAMUSCULAR; INTRAVENOUS AS NEEDED
Status: DISCONTINUED | OUTPATIENT
Start: 2023-11-30 | End: 2023-11-30 | Stop reason: SURG

## 2023-11-30 RX ORDER — CEFAZOLIN SODIUM 2 G/50ML
2 SOLUTION INTRAVENOUS
Status: COMPLETED | OUTPATIENT
Start: 2023-11-30 | End: 2023-11-30

## 2023-11-30 RX ORDER — PROPOFOL 10 MG/ML
INJECTION, EMULSION INTRAVENOUS AS NEEDED
Status: DISCONTINUED | OUTPATIENT
Start: 2023-11-30 | End: 2023-11-30 | Stop reason: SURG

## 2023-11-30 RX ORDER — ROCURONIUM BROMIDE 50 MG/5 ML
SYRINGE (ML) INTRAVENOUS AS NEEDED
Status: DISCONTINUED | OUTPATIENT
Start: 2023-11-30 | End: 2023-11-30 | Stop reason: SURG

## 2023-11-30 RX ORDER — SODIUM CHLORIDE, SODIUM LACTATE, POTASSIUM CHLORIDE, CALCIUM CHLORIDE 600; 310; 30; 20 MG/100ML; MG/100ML; MG/100ML; MG/100ML
1000 INJECTION, SOLUTION INTRAVENOUS CONTINUOUS
Status: DISCONTINUED | OUTPATIENT
Start: 2023-11-30 | End: 2023-11-30 | Stop reason: HOSPADM

## 2023-11-30 RX ADMIN — PROPOFOL 180 MG: 10 INJECTION, EMULSION INTRAVENOUS at 09:42

## 2023-11-30 RX ADMIN — FAMOTIDINE 20 MG: 10 INJECTION INTRAVENOUS at 08:55

## 2023-11-30 RX ADMIN — Medication 50 MG: at 09:08

## 2023-11-30 RX ADMIN — CEFAZOLIN SODIUM 2 G: 2 SOLUTION INTRAVENOUS at 09:38

## 2023-11-30 RX ADMIN — Medication 60 MG: at 09:42

## 2023-11-30 RX ADMIN — SUGAMMADEX 200 MG: 100 INJECTION, SOLUTION INTRAVENOUS at 10:41

## 2023-11-30 RX ADMIN — Medication 50 MG: at 09:42

## 2023-11-30 RX ADMIN — MIDAZOLAM HYDROCHLORIDE 2 MG: 1 INJECTION, SOLUTION INTRAMUSCULAR; INTRAVENOUS at 09:50

## 2023-11-30 RX ADMIN — DEXAMETHASONE SODIUM PHOSPHATE 4 MG: 4 INJECTION INTRA-ARTICULAR; INTRALESIONAL; INTRAMUSCULAR; INTRAVENOUS; SOFT TISSUE at 09:48

## 2023-11-30 RX ADMIN — MIDAZOLAM HYDROCHLORIDE 2 MG: 1 INJECTION, SOLUTION INTRAMUSCULAR; INTRAVENOUS at 09:08

## 2023-11-30 RX ADMIN — FENTANYL CITRATE 50 MCG: 50 INJECTION, SOLUTION INTRAMUSCULAR; INTRAVENOUS at 09:42

## 2023-11-30 RX ADMIN — ONDANSETRON 4 MG: 2 INJECTION INTRAMUSCULAR; INTRAVENOUS at 09:40

## 2023-11-30 RX ADMIN — SODIUM CHLORIDE, POTASSIUM CHLORIDE, SODIUM LACTATE AND CALCIUM CHLORIDE 1000 ML: 600; 310; 30; 20 INJECTION, SOLUTION INTRAVENOUS at 08:55

## 2023-11-30 RX ADMIN — BUPIVACAINE HYDROCHLORIDE 20 ML: 5 INJECTION, SOLUTION EPIDURAL; INTRACAUDAL; PERINEURAL at 09:16

## 2023-11-30 RX ADMIN — HYDROMORPHONE HYDROCHLORIDE 0.5 MG: 1 INJECTION, SOLUTION INTRAMUSCULAR; INTRAVENOUS; SUBCUTANEOUS at 11:18

## 2023-11-30 RX ADMIN — DEXAMETHASONE SODIUM PHOSPHATE 10 MG: 10 INJECTION, SOLUTION INTRAMUSCULAR; INTRAVENOUS at 09:16

## 2023-11-30 RX ADMIN — FENTANYL CITRATE 50 MCG: 50 INJECTION, SOLUTION INTRAMUSCULAR; INTRAVENOUS at 09:08

## 2023-11-30 NOTE — OP NOTE
[address],  [address]  [place]  [address]  [phone]      OPERATIVE REPORT      PATIENT NAME:  Juliann Benitez                            YOB: 1980       PREOP DIAGNOSIS:   Left shoulder impingement, biceps tendinitis, labral tear, subacromial bursitis, and rotator cuff tendinopathy and possible small supraspinatus tear.    POSTOP DIAGNOSIS:  Left shoulder impingement, biceps tendinitis/synovitis,  type 1 fragmented labral tears, mild anterior glenoid  arthritis, normal sub-labral foramen, subscapularis tendon low grade partial thickness fraying, intact  supraspinatus tendinopathy without any tear and  subacromial bursitis.    PROCEDURE:    Left shoulder diagnostic arthroscopy, limited synovectomy, labral and limited subscapularis debridement, anterior glenoid chondroplasty, and subacromial bursectomy.    SURGEON:     Michael Diaz MD    OPERATIVE TEAM:   Circulator: Danny Camp RN; Yaneth Shi RN  Scrub Person: Sherrie Goldman    ANESTHETIST:  CRNA: Teja Robin CRNA    ANESTHESIA:  General plus regional    ESTIM BLOOD LOSS:   5 ml    FINDINGS:     Anterosuperior and posterosuperior labral degenerative tears, sub-labral foramen normal variant, mild anterior glenoid grade 3 chondral arthritis, intact long head of biceps with adjacent synovitis, low grade partial thickness fraying of subscapularis tendon, no Bankart lesion, no loose bodies, prominent outlet subacromial bursitis, type 1 horizontal acromion and  stable mild acromioclavicular arthritis without spurs.    SPECIMENS:    None.    IMPLANTS:     None.    COMPLICATIONS:      None.    DISPOSITION:    Stable to recovery.    INDICATIONS:     Shoulder pain, stiffness, weakness and dysfunction.    NARRATIVE:     Risks, benefits of proposed treatment and alternative options discussed and an informed consent for the elective surgical procedure obtained.  Risks discussed including but not limited to  anesthesia, infection, nerve/vessel/tendon injury, fracture, DVT, PE, recurrent symptoms or limitations.  Goals outlined including the potential for relief of pain and improved shoulder function and activity tolerance.    Antibiotic prophylaxis was given.  Surgeon site marking and a time out were performed prior to the procedure.  Anesthesia was effective and well-tolerated.  The patient was maintained in the modified reclined beach chair position with care taken to pad all areas and keep the away arm at and above the level of the atrium for DVT prophylaxis.  The shoulder, arm and hand was prepped and draped in the usual sterile fashion.  At the start of the procedure, a local injection was given at the skin portal sites.    Anterior, posterior and lateral portal sites were made for the arthroscopic portion of the procedure.  Evaluation of the shoulder reveals findings as noted in detail above. When the shoulder was abducted and external rotated, the labral would move wide from the glenoid with a teardrop shape opening consistent with a normal variant sub-labral foramen.  Treatment consisted of synovectomy, labral and chondral joint debridement using a 3.5 mm full radius shaver, limited subscapularis tendon debridement with the shaver and subacromial bursectomy with the shaver.  The outlet space was assessed and was well decompressed with no impingement bone spurs.  Minor bursal bleeding was well controlled with arthroscopic cautery.  Representative arthroscopic photos were saved throughout the diagnostic assessment and arthroscopy steps.  At the end of the procedure, the shoulder was irrigated thoroughly and suctioned.  Routine closure of the portal sites performed with staples and a sterile Aquacel dressing was applied.  A shoulder immobilizer was placed for support, protection and comfort.  Anesthesia was effective and well tolerated.  There were no complications of surgery. The patient was transferred in stable  condition to recovery.

## 2023-11-30 NOTE — ANESTHESIA PROCEDURE NOTES
Peripheral Block    Pre-sedation assessment completed: 11/30/2023 9:07 AM    Patient reassessed immediately prior to procedure    Patient location during procedure: holding area  Start time: 11/30/2023 9:16 AM  Stop time: 11/30/2023 9:22 AM  Reason for block: at surgeon's request and post-op pain management  Performed by  CRNA/CAA: Pablo Morin, CRNA  Preanesthetic Checklist  Completed: patient identified, IV checked, site marked, risks and benefits discussed, surgical consent, monitors and equipment checked, pre-op evaluation and timeout performed  Prep:  Pt Position: supine  Sterile barriers:cap, gloves, mask and sterile barriers  Prep: ChloraPrep  Patient monitoring: blood pressure monitoring, continuous pulse oximetry and EKG  Procedure    Sedation: yes  Performed under: MAC  Guidance:ultrasound guided    ULTRASOUND INTERPRETATION.  Using ultrasound guidance a gauge needle was placed in close proximity to the brachial plexus nerve, at which point, under ultrasound guidance anesthetic was injected in the area of the nerve and spread of the anesthesia was seen on ultrasound in close proximity thereto.  There were no abnormalities seen on ultrasound; a digital image was taken; and the patient tolerated the procedure with no complications. Images:still images obtained  Loss of twitch: 0.5 mA  Laterality:left  Block Type:interscalene  Injection Technique:catheter  Needle Type:echogenic  Needle Gauge:20 G  Resistance on Injection: none  Catheter Size:18 G  Cath Depth at skin: 8 cm    Medications Used: bupivacaine PF (MARCAINE) injection 0.5% - Injection   20 mL - 11/30/2023 9:16:00 AM      Medications  Comment:Adjuncts per total volume of LA:    Decadron 10 mg PSF      If required, intravenous sedation was given -- see meds on anesthesia record.    Post Assessment  Injection Assessment: negative aspiration for heme, no paresthesia on injection and incremental injection  Patient Tolerance:comfortable throughout  block  Complications:no  Additional Notes  Procedure:                CATHETER INTERSCALENE                                                                        Catheter at skin-8                                       Patient analgesia was achieved with IV Sedation( see meds)      The pt was placed in semi-fowlers position with a slight tilt of the thorax contralateral to the insertion site.  The Insertion Site was prepped and draped in sterile fashion.  The skin was anesthetized with Lidocaine 1% 1ml injection utilizing a 25g needle.  Utilizing ultrasound guidance, a I-Flow 18 ga echogenic needle was advanced in-plane.  Major vessels (carotid and Internal Jugular) were visualized as the brachial plexus was approached at the approximate level of C-7/ T-1.  Cervical 5 and Branches of Cervical 6 nerve roots were visualized and the needle tip was placed posterior at the level of C-6 roots.  LA spread was visualized and injection was made incrementally every 5 mls with aspiration. Injection pressure was normal or little; there was no intraneural injection, no vascular injection.      The I-Flow 20  catheter was then placed under ultrasound guidance on the posterior aspect of the Brachial Plexus. Location of catheter was confirmed with NS or air injection visualized with ultrasound . The needle was then removed and the skin was sealed with Skin Affiix at catheter insertion site.  Skin was prepped with benzoin or mastisol and the labeled curled catheter was secured with steristrips and a transparent dressing.

## 2023-11-30 NOTE — ANESTHESIA PREPROCEDURE EVALUATION
Anesthesia Evaluation     Patient summary reviewed and Nursing notes reviewed   no history of anesthetic complications:   NPO Solid Status: > 8 hours  NPO Liquid Status: > 8 hours           Airway   Mallampati: I  TM distance: >3 FB  Neck ROM: full  No difficulty expected  Dental - normal exam     Pulmonary - normal exam   (+) a smoker Former, asthma,recent URI    ROS comment: History of Bronchitis with active cough. Patients states been taking Abx for 2 weeks  Cardiovascular - normal exam    Rhythm: regular  Rate: normal    (+) valvular problems/murmurs murmur      Neuro/Psych  (+) headaches  GI/Hepatic/Renal/Endo    (+) renal disease- stones    Musculoskeletal     Abdominal  - normal exam    Bowel sounds: normal.   Substance History   (+) alcohol use     OB/GYN negative ob/gyn ROS   (-)  Pregnant        Other                      Anesthesia Plan    ASA 2     general with block     (Risks and benefits discussed including risk of aspiration, recall and dental damage. All patient questions answered.    Patient told that a breathing tube will be used to manage the airway.    ISB for POPC.    Will continue with plan of care.)  intravenous induction     Anesthetic plan, risks, benefits, and alternatives have been provided, discussed and informed consent has been obtained with: patient.

## 2023-11-30 NOTE — ANESTHESIA PROCEDURE NOTES
Airway  Urgency: elective    Date/Time: 11/30/2023 9:43 AM  Airway not difficult    General Information and Staff    Patient location during procedure: OR  CRNA/CAA: Teja Robin CRNA    Indications and Patient Condition  Indications for airway management: airway protection    Preoxygenated: yes  MILS not maintained throughout  Mask difficulty assessment: 1 - vent by mask    Final Airway Details  Final airway type: endotracheal airway      Successful airway: ETT  Cuffed: yes   Successful intubation technique: direct laryngoscopy  Endotracheal tube insertion site: oral  Blade: Edgar  Blade size: 3  ETT size (mm): 7.0  Cormack-Lehane Classification: grade I - full view of glottis  Placement verified by: chest auscultation and capnometry   Cuff volume (mL): 7  Measured from: lips  ETT/EBT  to lips (cm): 21  Number of attempts at approach: 1  Assessment: lips, teeth, and gum same as pre-op and atraumatic intubation    Additional Comments  Negative epigastric sounds, Breath sound equal bilaterally with symmetric chest rise and fall

## 2023-11-30 NOTE — ANESTHESIA POSTPROCEDURE EVALUATION
Patient: Juliann Benitez    Procedure Summary       Date: 11/30/23 Room / Location: [facility] OR  /  [facility] OR    Anesthesia Start: 0937 Anesthesia Stop: 1058    Procedure: Left shoulder diagnostic arthroscopy, synovectomy, labral and subscapularis debridement and subacromial busectomy. (Left: Shoulder) Diagnosis:       Nontraumatic incomplete tear of left rotator cuff      Chronic left shoulder pain      Impingement syndrome of left shoulder      (Nontraumatic incomplete tear of left rotator cuff [M75.112])      (Chronic left shoulder pain [M25.512, G89.29])      (Impingement syndrome of left shoulder [M75.42])    Surgeons: Freddy Diaz MD Provider: Teja Robin CRNA    Anesthesia Type: general with block ASA Status: 2            Anesthesia Type: general with block    Vitals  Vitals Value Taken Time   BP     Temp     Pulse     Resp     SpO2 98 % 11/30/23 1059   Vitals shown include unfiled device data.        Post Anesthesia Care and Evaluation    Patient location during evaluation: PACU  Patient participation: complete - patient participated  Level of consciousness: awake and alert  Pain score: 0  Pain management: satisfactory to patient    Airway patency: patent  Anesthetic complications: No anesthetic complications  PONV Status: none  Cardiovascular status: acceptable and stable  Respiratory status: acceptable  Hydration status: acceptable    Comments: Vitals signs as noted in nursing documentation as per protocol.

## 2023-11-30 NOTE — INTERVAL H&P NOTE
H&P reviewed. The patient was examined and there are no changes to the H&P.    Pre-op chest x-ray shows bronchitis without pneumonia.  Patient states she had a mild cough two weeks ago that is now resolved and she feels well.  She is afebrile with no pulmonary symptoms currently.    Vitals:    11/30/23 0925   BP: 121/94   Pulse: 84   Resp: 18   Temp:    SpO2: 99%       Freddy Diaz MD  11/30/2023  09:30 EST

## 2023-12-01 ENCOUNTER — TELEPHONE (OUTPATIENT)
Dept: ORTHOPEDIC SURGERY | Facility: CLINIC | Age: 43
End: 2023-12-01
Payer: COMMERCIAL

## 2023-12-01 NOTE — TELEPHONE ENCOUNTER
Returned call to patient to inform I have put in an updated work status to reflect off work for surgery on 11/30/23 until 1st post op on 12/14/23

## 2023-12-01 NOTE — TELEPHONE ENCOUNTER
Caller: DARREL     Relationship: SELF     Best call back number:[phone] (home)       What form or medical record are you requesting: PATIENTS SHORT THERM AND FMLA RAN OUT 11/30/23, NEEDS A WORK NOTE STATING THAT SHE NEEDS AN EXTENSION AT LEAST UNTIL HER UPCOMING APPT ON 12/14/23     How would you like to receive the form or medical records (pick-up, mail, fax): MY CHART

## 2023-12-14 ENCOUNTER — TELEPHONE (OUTPATIENT)
Dept: ORTHOPEDIC SURGERY | Facility: CLINIC | Age: 43
End: 2023-12-14

## 2023-12-14 ENCOUNTER — OFFICE VISIT (OUTPATIENT)
Dept: ORTHOPEDIC SURGERY | Facility: CLINIC | Age: 43
End: 2023-12-14
Payer: COMMERCIAL

## 2023-12-14 VITALS — BODY MASS INDEX: 31.22 KG/M2 | WEIGHT: 159 LBS | HEIGHT: 60 IN | TEMPERATURE: 98.4 F

## 2023-12-14 DIAGNOSIS — Z98.890 STATUS POST ARTHROSCOPY OF LEFT SHOULDER: Primary | ICD-10-CM

## 2023-12-14 NOTE — TELEPHONE ENCOUNTER
Provider: DEATON     Caller: MACK CASTILLO     Relationship to Patient: SELF     Phone Number: [phone] (home)       Reason for Call: PATIENT HAD AN APPT TODAY 12/14/23 FOR HER L SHOULDER. SHE NEEDS A DOCTORS NOTE FOR TODAY. SHE ALSO NEEDS A NOTE STATING SHE WILL BE OFF UNTIL HER NEXT APPT 01/09/24.  PATIENT WOULD LIKE THAT UPLOADED ONTO Interventional Spine.    PLEASE ADVISE

## 2023-12-14 NOTE — PROGRESS NOTES
Post Op Note     Name: Juliann Benitez    : 1980     MRN: [id number]     Chief Complaint  Post-op of the Left Shoulder (Status post Left shoulder diagnostic arthroscopy, synovectomy, labral and subscapularis debridement and subacromial bursectomy on 23. States she is doing well, she does have some tenderness and occasional muscle spasms.) and Suture / Staple Removal    Subjective     History of Present Illness:  Juliann Benitez is a 43 y.o. female who presents today for 2-week follow-up for left shoulder arthroscopy.  Patient states that she is doing very well she has no significant pain at rest.  She does continue to have pain with elevation abduction and internal rotation of the shoulder as well as decreased range of motion all of which are expected following her procedure.  She denies any numbness and tingling.  She states that she did have a rash develop in the axilla area which may have been from the iodine or any skin adhesive that the use doing the surgery.  She states the rash has improved significantly with over-the-counter cortisone.    Review of Systems   Constitutional:  Negative for fever.   HENT:  Negative for dental problem and voice change.    Eyes:  Negative for visual disturbance.   Respiratory:  Negative for shortness of breath.    Cardiovascular:  Negative for chest pain.   Gastrointestinal:  Negative for abdominal pain.   Genitourinary:  Negative for dysuria.   Musculoskeletal:  Positive for arthralgias (left shoulder). Negative for gait problem and joint swelling.   Skin:  Negative for rash.   Neurological:  Negative for speech difficulty.   Hematological:  Does not bruise/bleed easily.   Psychiatric/Behavioral:  Negative for confusion.         Pain controlled: [] no   [x] yes   Medication refill requested: [x] no   [] yes    Patient compliant with instructions: [] no   [x] yes   Other: Reports excellent progress since surgery.     Past Medical History:   Diagnosis Date     "Alpha-1-antitrypsin deficiency     Asthma     Heart murmur     Heart palpitations     History of echocardiogram     patient states normal    Kidney infection     Kidney stone     Pneumonia 9/10    Pregnancy     A1 s/p  x 2    Tattoo     X5    Torn rotator cuff     left    Ureteral reflux     PT UNSURE ABOUT THIS IN THE PAST        Past Surgical History:   Procedure Laterality Date    BREAST AUGMENTATION      D & C HYSTEROSCOPY ENDOMETRIAL ABLATION N/A 2019    Procedure: DILATATION AND CURETTAGE HYSTEROSCOPY NOVASURE ENDOMETRIAL ABLATION;  Surgeon: Paxton Amos MD;  Location: [facility];  Service: Obstetrics/Gynecology    EXPLORATORY LAPAROTOMY      STATES CYSTS REMOVED FROM OVARIES    SHOULDER ARTHROSCOPY W/ ROTATOR CUFF REPAIR Left 2023    Procedure: Left shoulder diagnostic arthroscopy, synovectomy, labral and subscapularis debridement and subacromial busectomy.;  Surgeon: Freddy Diaz MD;  Location: [facility];  Service: Orthopedics;  Laterality: Left;       Allergies   Allergen Reactions    Chlorhexidine Rash     CHG wipes       Objective   Temp 98.4 °F (36.9 °C)   Ht 152.4 cm (60\")   Wt 72.1 kg (159 lb)   BMI 31.05 kg/m²             Signs of infection: [x] no                    [] yes   Drainage: [x] no                    [] yes   Incision: [x] healing well     []healed well   Motor exam intact: [] no                    [x] yes   Neurovascular exam intact: [] no                    [x] yes   Signs of compartment syndrome: [x] no                    [] yes   Signs of DVT: [x] no                    [] yes   Other:      Physical Exam  Left Shoulder Exam     Range of Motion   Active abduction:  50   Extension:  30   External rotation:  80   Forward flexion:  50   Internal rotation 0 degrees:  L5     Other   Erythema: present  Sensation: normal  Pulse: present     Comments:  Mild erythema is noted around the incisions most likely from adhesive from bandage.  No suspicion of " infection.            Extremity DVT signs are negative by clinical screen.    dependent Review of Radiographic Studies:    No new imaging done today.    Laboratory and Other Studies:  No new results reviewed today.     Medical Decision Making:    Stable post-operative exam and expected early progress.    Procedures    Assessment and Plan     Diagnoses and all orders for this visit:    1. Status post arthroscopy of left shoulder (Primary)  -     Ambulatory Referral to Physical Therapy POST OP        Recommendations/Plan:     Sutures Staples or Pins [x] Removed today  [] At prior visit  [] Plan removal later   Physical therapy: []rehab facility  [x]outpatient referral  [] therapy ongoing   Ultrasound: [x]not ordered         []order given to patient   Labs: [x]not ordered         []order given to patient   Weight Bearing status: []Full []WBAT [x]PWB []NWB []Other     Discussion of orthopaedic goals and activities and patient and/or guardian expressed appreciation.  Regular exercise as tolerated  Guided on proper techniques for mobility, strength, agility and/or conditioning exercises  Weight bearing parameters reviewed  Take prescribed medications as instructed only as tolerated     Exercise, medications, injections, other patient advice, and return appointment as noted.  Brace: No brace was given at today's visit.  Referral: Physical and Occupational Therapy referral.  Test/Studies: No additional studies ordered at this time.  Work/Activity Status: no strenuous activity.  Limit weightbearing to 5 to 10 pounds as tolerated.  Begin physical therapy.  Continue off work for at least another 3 weeks.  If patient shows good progress with physical therapy she may return to work in 3 weeks if requested.    Return in about 4 weeks (around 1/11/2024) for Recheck.  Patient is encouraged and agreeable to call or return sooner for any issues or concerns.   Answers submitted by the patient for this visit:  Other (Submitted on  12/7/2023)  Please describe your symptoms.: Fowling up after surgery  Have you had these symptoms before?: No  How long have you been having these symptoms?: Greater than 2 weeks  Primary Reason for Visit (Submitted on 12/7/2023)  What is the primary reason for your visit?: Other

## 2024-01-09 ENCOUNTER — OFFICE VISIT (OUTPATIENT)
Dept: ORTHOPEDIC SURGERY | Facility: CLINIC | Age: 44
End: 2024-01-09
Payer: COMMERCIAL

## 2024-01-09 VITALS — HEIGHT: 60 IN | TEMPERATURE: 97.6 F | WEIGHT: 159 LBS | BODY MASS INDEX: 31.22 KG/M2

## 2024-01-09 DIAGNOSIS — Z98.890 STATUS POST ARTHROSCOPY OF LEFT SHOULDER: Primary | ICD-10-CM

## 2024-01-09 DIAGNOSIS — M75.42 IMPINGEMENT SYNDROME OF LEFT SHOULDER: ICD-10-CM

## 2024-01-09 PROCEDURE — 99024 POSTOP FOLLOW-UP VISIT: CPT | Performed by: STUDENT IN AN ORGANIZED HEALTH CARE EDUCATION/TRAINING PROGRAM

## 2024-01-09 NOTE — PROGRESS NOTES
Post Op Note     Name: Juliann Benitez    : 1980     MRN: [id number]     Chief Complaint  Post-op of the Left Shoulder (Status post ATS on 23. States she is doing great, she has been doing PT twice weekly. )    Subjective     History of Present Illness:  Juliann Benitez is a 43 y.o. female who presents today for 6-week follow-up for left shoulder arthroscopy.  Patient states that she is doing great and denies any significant pain and/or other limitations at this time.  She does note that she gets the occasional twinge in the shoulder usually when she is laying down although it is very short lasting and very tolerable.  She states that she feels ready to return to work with no restrictions and she is hopeful to get the bid for a different job that would be easier on her.    Review of Systems   Constitutional:  Negative for fever.   HENT:  Negative for dental problem and voice change.    Eyes:  Negative for visual disturbance.   Respiratory:  Negative for shortness of breath.    Cardiovascular:  Negative for chest pain.   Gastrointestinal:  Negative for abdominal pain.   Genitourinary:  Negative for dysuria.   Musculoskeletal:  Positive for arthralgias (left shoulder). Negative for gait problem and joint swelling.   Skin:  Negative for rash.   Neurological:  Negative for speech difficulty.   Hematological:  Does not bruise/bleed easily.   Psychiatric/Behavioral:  Negative for confusion.         Pain controlled: [] no   [x] yes   Medication refill requested: [x] no   [] yes    Patient compliant with instructions: [] no   [x] yes   Other: Reports excellent progress since surgery.     Past Medical History:   Diagnosis Date    Alpha-1-antitrypsin deficiency     Asthma     Heart murmur     Heart palpitations     History of echocardiogram     patient states normal    Kidney infection     Kidney stone     Pneumonia 9/10    Pregnancy     A1 s/p  x 2    Tattoo     X5    Torn rotator cuff     left     "Ureteral reflux     PT UNSURE ABOUT THIS IN THE PAST        Past Surgical History:   Procedure Laterality Date    BREAST AUGMENTATION  2011    D & C HYSTEROSCOPY ENDOMETRIAL ABLATION N/A 03/04/2019    Procedure: DILATATION AND CURETTAGE HYSTEROSCOPY NOVASURE ENDOMETRIAL ABLATION;  Surgeon: Paxton Amos MD;  Location: [facility];  Service: Obstetrics/Gynecology    EXPLORATORY LAPAROTOMY      STATES CYSTS REMOVED FROM OVARIES    SHOULDER ARTHROSCOPY W/ ROTATOR CUFF REPAIR Left 11/30/2023    Procedure: Left shoulder diagnostic arthroscopy, synovectomy, labral and subscapularis debridement and subacromial busectomy.;  Surgeon: Freddy Diaz MD;  Location: [facility];  Service: Orthopedics;  Laterality: Left;       Allergies   Allergen Reactions    Chlorhexidine Rash     CHG wipes       Objective   Temp 97.6 °F (36.4 °C)   Ht 152.4 cm (60\")   Wt 72.1 kg (159 lb)   BMI 31.05 kg/m²             Signs of infection: [x] no                    [] yes   Drainage: [x] no                    [] yes   Incision: [x] healing well     []healed well   Motor exam intact: [] no                    [x] yes   Neurovascular exam intact: [] no                    [x] yes   Signs of compartment syndrome: [x] no                    [] yes   Signs of DVT: [x] no                    [] yes   Other:      Physical Exam  Left Shoulder Exam     Tenderness   The patient is experiencing no tenderness.     Range of Motion   Active abduction:  150   Extension:  40   External rotation:  90   Forward flexion:  170   Internal rotation 0 degrees:  normal     Muscle Strength   Abduction: 4/5   Internal rotation: 5/5   External rotation: 5/5   Supraspinatus: 5/5   Subscapularis: 5/5   Biceps: 5/5     Tests   Drummond test: negative  Impingement: negative  Drop arm: negative    Other   Erythema: absent  Scars: present  Sensation: normal  Pulse: present     Comments:  Incisions healing very well with very minimal scarring.            Extremity DVT " signs are negative by clinical screen.    dependent Review of Radiographic Studies:    No new imaging done today.    Laboratory and Other Studies:  No new results reviewed today.     Medical Decision Making:    Excellent progress.  Routine activity and exercise as tolerated.    Procedures    Assessment and Plan     Diagnoses and all orders for this visit:    1. Status post arthroscopy of left shoulder (Primary)    2. Impingement syndrome of left shoulder        Recommendations/Plan:     Sutures Staples or Pins [] Removed today  [x] At prior visit  [] Plan removal later   Physical therapy: []rehab facility  []outpatient referral  [x] therapy ongoing   Ultrasound: [x]not ordered         []order given to patient   Labs: [x]not ordered         []order given to patient   Weight Bearing status: []Full [x]WBAT []PWB []NWB []Other     Discussion of orthopaedic goals and activities and patient and/or guardian expressed appreciation.  Regular exercise as tolerated  Guided on proper techniques for mobility, strength, agility and/or conditioning exercises  Weight bearing parameters reviewed  Take prescribed medications as instructed only as tolerated     Exercise, medications, injections, other patient advice, and return appointment as noted.  Brace: No brace was given at today's visit.  Referral: No referrals made at today's visit.  Test/Studies: No additional studies ordered at this time.  Work/Activity Status: Usual activities, routine exercise as tolerated, light physical work as tolerated, no strenuous activity.    Patient advised that she was doing very well and continue with home exercise plan.  She was given a work note stating that she may return to work on 1/10/2024 with no restrictions.  I recommended follow-up as needed and call or return office for any concerns.  Patient agreeable with plan.    Return if symptoms worsen or fail to improve.  Patient is encouraged and agreeable to call or return sooner for any issues  or concerns.

## 2024-01-09 NOTE — LETTER
January 9, 2024     Patient: Juliann Benitez   YOB: 1980   Date of Visit: 1/9/2024       To Whom It May Concern:    It is my medical opinion that Juliann Benitez may return to full duty 1/10/24. with no restrictions.          Sincerely,        Joshua Jacob PA-C

## 2024-04-01 PROBLEM — E66.9 OBESITY (BMI 30-39.9): Status: ACTIVE | Noted: 2024-04-01

## 2024-04-01 NOTE — PROGRESS NOTES
[facility]  [address]  [place] [address]   Office Note      Date: 2024  Patient Name: Juliann Benitez  MRN: [id number]  : 1980    Subjective     Chief Complaint  Obesity Management New Patient          Juliann Benitez presents to [facility] for obesity management. She has a past medical history of hyperlipidemia, alpha-1 antitrypsin deficiency, heart palpitation, kidney stones and infection. She reports her cardiac work up including a hostler monitor where normal. She was advised to do additional workup but was not satisfied with office and prefers to find new cardiologist. She has had one episode of chest pains and had a work up after. She was advised it was not a heart attack and pain has resolved.  The palpitations worsen with caffeine.     Patient is interested in medication and has used phentermine in the past. This was prior to developing heart palpations.   Reports that she currently eating low carbohydrate and high protein meals and is not certain why she is not losing faster. She currently is using loose it kalpana and monitoring her calories. She is open to trying different apps to food journal    Highest lifetime weight: 170 pounds. Today's weight is 71.2 kg (157 lb) pounds.   Weight 5 years ago: 110  The patient is exercising with a FITT score of:    Frequency   Intensity Time Strength Training   []   0 None  []   0 None  []   0 None  []   0 None    [x]   1 (1-2x/week) [x]   1 (light) []   1 (<10 min) [x]   1 (1x/week)   []   2 (3-5x/week) []   2 (moderate) []   2 (10-20 min) []   2 (2x/week)   []   3 (daily)   []   3 (moderately hard)  []   4 (very hard) [x]   3 (20-30 min)  []   4 (>30 min) []   3 (3-4x/week)         The following seem to sabotage weight loss efforts:comfort/stress eating, enjoyment of food, and boredom eating    Review of Systems   Constitutional:  Negative for fatigue.        Positive for  "weight gain   HENT:  Negative for trouble swallowing.         Negative for throat swelling   Respiratory:  Negative for shortness of breath and wheezing.         Negative for snoring   Cardiovascular:  Negative for chest pain, palpitations and leg swelling.   Gastrointestinal:  Negative for abdominal pain, constipation, diarrhea, GERD and indigestion.   Endocrine: Negative for cold intolerance, heat intolerance, polydipsia, polyphagia and polyuria.        Negative for loss of hair  Negative for hirsutism     Genitourinary:         Denies menstrual irregularities   Musculoskeletal:  Negative for arthralgias.        Denies exercise limitations  Denies chronic pain   Skin:  Negative for dry skin.        Negative for acne   Neurological:  Negative for headache and memory problem.        Negative for paresthesias   Psychiatric/Behavioral:  Negative for self-injury, sleep disturbance, suicidal ideas and depressed mood. The patient is not nervous/anxious.    All other systems reviewed and are negative.    PHQ-9 Total Score: 0     Objective   Body mass index is 30.16 kg/m².  Body composition analysis completed and showed:   %body fat: 38.8     Measurements (in inches)  Neck: 13  Chest: 39.5  Waist: 36  Hips: 43  Thighs: 39.5    Vital Signs:   /80 (BP Location: Left arm, Patient Position: Sitting)   Pulse 61   Ht 153.7 cm (60.5\")   Wt 71.2 kg (157 lb)   BMI 30.16 kg/m²       Physical Exam  Constitutional:       Appearance: Normal appearance. She is obese.   Cardiovascular:      Rate and Rhythm: Normal rate and regular rhythm.      Heart sounds: Normal heart sounds.   Pulmonary:      Effort: Pulmonary effort is normal. No respiratory distress.      Breath sounds: Normal breath sounds.   Skin:     General: Skin is warm and dry.   Neurological:      Mental Status: She is alert and oriented to person, place, and time.   Psychiatric:         Attention and Perception: Attention and perception normal.         Mood and " Affect: Mood normal.         Speech: Speech normal.         Behavior: Behavior normal.        Result Review :     Common labs          11/27/2023    09:44   Common Labs   Glucose 96    BUN 9    Creatinine 0.65    Sodium 138    Potassium 4.2    Chloride 103    Calcium 9.3    Albumin 4.3    Total Bilirubin 0.3    Alkaline Phosphatase 58    AST (SGOT) 20    ALT (SGPT) 11    WBC 6.12    Hemoglobin 14.6    Hematocrit 43.0    Platelets 363                  Assessment / Plan       Diagnoses and all orders for this visit:    1. Obesity (BMI 30-39.9) (Primary)  Assessment & Plan:  Patient's (Body mass index is 30.16 kg/m².) indicates that they are obese (BMI >30) with health conditions that include dyslipidemias . Weight is newly identified. BMI  is above average; BMI management plan is completed. We discussed low calorie, low carb based diet program, portion control, increasing exercise, and an kalpana-based approach such as [employer] Pal or Lose It.   --This is a new point new patient visit.  She has a friend that is seen at this clinic and that is how she heard about us.  --Body composition reviewed and short and long-term weight loss goals set up based on this information.  --[employer] food journal set up with calorie and macro goals.  Patient advised that her #1 goal over the next couple weeks is to journal all foods and all meals.  Ask her to bring in her food journal to next office visit for review.  --Patient is interested in pharmacology.  We did review her medication eligibility form and it does appear that injectable weight loss medications are not covered.  We did discuss alternatives such as topiramate.  In the past she has used phentermine but has since developed heart palpitations and advised we would need cardiac clearance prior to using these.  We discussed the option of using just topiramate as an oral agent.  --Cardiology referral sent in.  --Fasting labs completed in office today.    Orders:  -     CBC &  Differential; Future  -     Cancel: Comprehensive Metabolic Panel; Future  -     Hemoglobin A1c; Future  -     Insulin, Total; Future  -     Lipid Panel; Future  -     T3, Free; Future  -     TSH; Future  -     Urine Drug Screen - Urine, Clean Catch; Future  -     Vitamin D,25-Hydroxy; Future  -     CBC & Differential  -     Comprehensive Metabolic Panel  -     Hemoglobin A1c  -     Insulin, Total  -     Lipid Panel  -     T3, Free  -     TSH  -     Urine Drug Screen - Urine, Clean Catch  -     Vitamin D,25-Hydroxy    2. Vitamin D deficiency  -     Vitamin D,25-Hydroxy; Future  -     Vitamin D,25-Hydroxy    3. Hyperlipidemia, unspecified hyperlipidemia type  -     Lipid Panel; Future  -     T3, Free; Future  -     TSH; Future  -     Lipid Panel  -     T3, Free  -     TSH    4. Chest pain, unspecified type  -     Ambulatory Referral to Cardiology    5. Heart palpitations  -     Ambulatory Referral to Cardiology         Topics of discussion included obesity as a disease, nutritional education on food groups, exercise, and medications. Patient was instructed in adequate protein, controlled carb and controlled fat intake. Patient received instructions on using the medicines as a tool in controlling their weight with nutritional and behavioral changes. Risks and benefits were discussed. I believe the potential benefits of medication helping to decrease weight outweighs the risks. Patient is to try nutritonal/behavioral changes only first. Patient received our clinic education booklet.   Our patient consent form was reviewed including potential risks of weight loss. We also reviewed our confidentiality and HIPPA statements. Patients current FITT score was reviewed along with current capability for exercise tolerance and a patient will work towards a FITT score of: Patient's past medical history was reviewed in detail and barriers to weight loss were identified and discussed. Past efforts at weight reduction on their own  as well as under medical provider supervision were documented and discussed.  I advised patient to continue routine care with their Primary Care Provider. Nutritional recommendations and goals were reviewed based on body composition analysis including basal metabolic rate. Goal set for Calories,  adjusted for exercise calories burnt as well as Macronutrient. Take other medications and supplements as directed. Increase physical activity as tolerated without side effects.     I spent 60 minutes on this date of service. This time includes time spent by me in the following activities:preparing for the visit, counseling and educating the patient/family/caregiver, ordering medications, tests, or procedures and documenting information in the medical record.    Follow Up   Return in about 4 weeks (around 4/30/2024).  Patient was given instructions and counseling regarding her condition or for health maintenance advice. Please see specific information pulled into the AVS if appropriate.     Lana Tadeo, KAYLA  04/02/2024

## 2024-04-02 ENCOUNTER — OFFICE VISIT (OUTPATIENT)
Dept: BARIATRICS/WEIGHT MGMT | Facility: CLINIC | Age: 44
End: 2024-04-02
Payer: COMMERCIAL

## 2024-04-02 VITALS
WEIGHT: 157 LBS | HEART RATE: 61 BPM | BODY MASS INDEX: 29.64 KG/M2 | DIASTOLIC BLOOD PRESSURE: 80 MMHG | HEIGHT: 61 IN | SYSTOLIC BLOOD PRESSURE: 118 MMHG

## 2024-04-02 DIAGNOSIS — E55.9 VITAMIN D DEFICIENCY: ICD-10-CM

## 2024-04-02 DIAGNOSIS — E78.5 HYPERLIPIDEMIA, UNSPECIFIED HYPERLIPIDEMIA TYPE: ICD-10-CM

## 2024-04-02 DIAGNOSIS — R00.2 HEART PALPITATIONS: ICD-10-CM

## 2024-04-02 DIAGNOSIS — E66.9 OBESITY (BMI 30-39.9): Primary | ICD-10-CM

## 2024-04-02 DIAGNOSIS — R07.9 CHEST PAIN, UNSPECIFIED TYPE: ICD-10-CM

## 2024-04-02 PROCEDURE — 99417 PROLNG OP E/M EACH 15 MIN: CPT | Performed by: NURSE PRACTITIONER

## 2024-04-02 PROCEDURE — 99215 OFFICE O/P EST HI 40 MIN: CPT | Performed by: NURSE PRACTITIONER

## 2024-04-02 NOTE — ASSESSMENT & PLAN NOTE
Patient's (Body mass index is 30.16 kg/m².) indicates that they are obese (BMI >30) with health conditions that include dyslipidemias . Weight is newly identified. BMI  is above average; BMI management plan is completed. We discussed low calorie, low carb based diet program, portion control, increasing exercise, and an kalpana-based approach such as [employer] Pal or Lose It.   --This is a new point new patient visit.  She has a friend that is seen at this clinic and that is how she heard about us.  --Body composition reviewed and short and long-term weight loss goals set up based on this information.  --[employer] food journal set up with calorie and macro goals.  Patient advised that her #1 goal over the next couple weeks is to journal all foods and all meals.  Ask her to bring in her food journal to next office visit for review.  --Patient is interested in pharmacology.  We did review her medication eligibility form and it does appear that injectable weight loss medications are not covered.  We did discuss alternatives such as topiramate.  In the past she has used phentermine but has since developed heart palpitations and advised we would need cardiac clearance prior to using these.  We discussed the option of using just topiramate as an oral agent.  --Cardiology referral sent in.  --Fasting labs completed in office today.

## 2024-04-03 LAB
25(OH)D3+25(OH)D2 SERPL-MCNC: 27.1 NG/ML (ref 30–100)
ALBUMIN SERPL-MCNC: 4.3 G/DL (ref 3.9–4.9)
ALBUMIN/GLOB SERPL: 2 {RATIO} (ref 1.2–2.2)
ALP SERPL-CCNC: 56 IU/L (ref 44–121)
ALT SERPL-CCNC: 7 IU/L (ref 0–32)
AMPHETAMINES UR QL SCN: NEGATIVE NG/ML
AST SERPL-CCNC: 7 IU/L (ref 0–40)
BARBITURATES UR QL SCN: NEGATIVE NG/ML
BASOPHILS # BLD AUTO: 0.1 X10E3/UL (ref 0–0.2)
BASOPHILS NFR BLD AUTO: 1 %
BENZODIAZ UR QL SCN: NEGATIVE NG/ML
BILIRUB SERPL-MCNC: 0.5 MG/DL (ref 0–1.2)
BUN SERPL-MCNC: 10 MG/DL (ref 6–24)
BUN/CREAT SERPL: 13 (ref 9–23)
BZE UR QL SCN: NEGATIVE NG/ML
CALCIUM SERPL-MCNC: 9.2 MG/DL (ref 8.7–10.2)
CANNABINOIDS UR QL SCN: NEGATIVE NG/ML
CHLORIDE SERPL-SCNC: 107 MMOL/L (ref 96–106)
CHOLEST SERPL-MCNC: 212 MG/DL (ref 100–199)
CO2 SERPL-SCNC: 20 MMOL/L (ref 20–29)
CREAT SERPL-MCNC: 0.75 MG/DL (ref 0.57–1)
CREAT UR-MCNC: 141.6 MG/DL (ref 20–300)
EGFRCR SERPLBLD CKD-EPI 2021: 102 ML/MIN/1.73
EOSINOPHIL # BLD AUTO: 0.2 X10E3/UL (ref 0–0.4)
EOSINOPHIL NFR BLD AUTO: 3 %
ERYTHROCYTE [DISTWIDTH] IN BLOOD BY AUTOMATED COUNT: 12.4 % (ref 11.7–15.4)
GLOBULIN SER CALC-MCNC: 2.1 G/DL (ref 1.5–4.5)
GLUCOSE SERPL-MCNC: 91 MG/DL (ref 70–99)
HBA1C MFR BLD: 5.5 % (ref 4.8–5.6)
HCT VFR BLD AUTO: 44.6 % (ref 34–46.6)
HDLC SERPL-MCNC: 52 MG/DL
HGB BLD-MCNC: 14.6 G/DL (ref 11.1–15.9)
IMM GRANULOCYTES # BLD AUTO: 0 X10E3/UL (ref 0–0.1)
IMM GRANULOCYTES NFR BLD AUTO: 0 %
INSULIN SERPL-ACNC: 8.3 UIU/ML (ref 2.6–24.9)
LABORATORY COMMENT REPORT: NORMAL
LDLC SERPL CALC-MCNC: 147 MG/DL (ref 0–99)
LYMPHOCYTES # BLD AUTO: 2 X10E3/UL (ref 0.7–3.1)
LYMPHOCYTES NFR BLD AUTO: 30 %
MCH RBC QN AUTO: 30.2 PG (ref 26.6–33)
MCHC RBC AUTO-ENTMCNC: 32.7 G/DL (ref 31.5–35.7)
MCV RBC AUTO: 92 FL (ref 79–97)
METHADONE UR QL SCN: NEGATIVE NG/ML
MONOCYTES # BLD AUTO: 0.5 X10E3/UL (ref 0.1–0.9)
MONOCYTES NFR BLD AUTO: 7 %
NEUTROPHILS # BLD AUTO: 4 X10E3/UL (ref 1.4–7)
NEUTROPHILS NFR BLD AUTO: 59 %
OPIATES UR QL SCN: NEGATIVE NG/ML
OXYCODONE+OXYMORPHONE UR QL SCN: NEGATIVE NG/ML
PCP UR QL: NEGATIVE NG/ML
PH UR: 5.7 [PH] (ref 4.5–8.9)
PLATELET # BLD AUTO: 335 X10E3/UL (ref 150–450)
POTASSIUM SERPL-SCNC: 4.9 MMOL/L (ref 3.5–5.2)
PROPOXYPH UR QL SCN: NEGATIVE NG/ML
PROT SERPL-MCNC: 6.4 G/DL (ref 6–8.5)
RBC # BLD AUTO: 4.83 X10E6/UL (ref 3.77–5.28)
SODIUM SERPL-SCNC: 140 MMOL/L (ref 134–144)
T3FREE SERPL-MCNC: 3 PG/ML (ref 2–4.4)
TRIGL SERPL-MCNC: 71 MG/DL (ref 0–149)
TSH SERPL DL<=0.005 MIU/L-ACNC: 1.14 UIU/ML (ref 0.45–4.5)
VLDLC SERPL CALC-MCNC: 13 MG/DL (ref 5–40)
WBC # BLD AUTO: 6.8 X10E3/UL (ref 3.4–10.8)

## 2024-04-14 RX ORDER — ERGOCALCIFEROL 1.25 MG/1
50000 CAPSULE ORAL WEEKLY
Qty: 8 CAPSULE | Refills: 0 | Status: CANCELLED | OUTPATIENT
Start: 2024-04-14

## 2024-04-15 DIAGNOSIS — E66.9 OBESITY (BMI 30-39.9): ICD-10-CM

## 2024-04-15 DIAGNOSIS — E55.9 VITAMIN D DEFICIENCY: Primary | ICD-10-CM

## 2024-04-15 RX ORDER — ERGOCALCIFEROL 1.25 MG/1
50000 CAPSULE ORAL WEEKLY
Qty: 8 CAPSULE | Refills: 0 | Status: SHIPPED | OUTPATIENT
Start: 2024-04-15 | End: 2024-04-18 | Stop reason: SDUPTHER

## 2024-04-15 NOTE — PROGRESS NOTES
[facility]  [address]  [place] [address]     Office Note      Date: 2024  Patient Name: Juliann Benitez  MRN: [id number]  : 1980    Subjective     Chief Complaint  Obesity Management follow-up    Juliann Benitez presents to [facility] for obesity management.     Patient is unsatisfied with weight loss progress. Appetite is moderately controlled. Currently no medications prescribed by this office.  The patient is taking multivitamin and is taking fish oil.  The patient is using a food journal.  The patient rates current efforts as 10 out of 10. Reports she has not been called by referral coordinator to set up cardiology  appointment to date. Reports journaling all days and staying within calorie and carb goals.     The patient is exercising with a FITT score of:    Frequency Intensity Time Strength Training   []   0, none []   0 []   0 []   0   [x]   1 (1-2x/week) [x]   1 (light) []   1 (<10 min) [x]   1 (1x/week)   []   2 (3-5x/week) []   2 (moderate) []   2 (10-20 min) []   2 (2x/week)   []   3 (daily) []   3 (moderately hard)  []   4 (very hard) [x]   3 (20-30 min)  []   4 (>30 min) []   3 (3-4x/week)     Review of Systems   Constitutional:  Negative for appetite change and fatigue.   Eyes:  Negative for visual disturbance.   Cardiovascular:  Negative for chest pain and palpitations.   Gastrointestinal:  Negative for constipation and indigestion.   Neurological:  Negative for light-headedness.       Objective   Start weight: 157 pounds.    Total Loss lb/%Loss of beginning body weight (BBW): -2.4lb/-1.53%  Change in weight since last visit: -2.4    Recent Weight History:   Wt Readings from Last 6 Encounters:   24 70.1 kg (154 lb 9.6 oz)   24 71.2 kg (157 lb)   24 72.1 kg (159 lb)   23 72.1 kg (159 lb)   23 72.5 kg (159 lb 12.8 oz)   23 71.9 kg (158 lb 9.6 oz)     Body mass index is  "29.7 kg/m².   Body composition analysis completed and showed:   Body Fat %: 37.7    Measurements (in inches)  Waist Circumference: 36.5    Vital Signs:   /78 (BP Location: Left arm, Patient Position: Sitting)   Pulse 55   Ht 153.7 cm (60.5\")   Wt 70.1 kg (154 lb 9.6 oz)   SpO2 99%   BMI 29.70 kg/m²       Physical Exam  Vitals reviewed.   Constitutional:       Appearance: She is well-developed.      Comments: overweight   Pulmonary:      Effort: Pulmonary effort is normal.   Neurological:      Mental Status: She is alert.   Psychiatric:         Attention and Perception: Attention normal.         Mood and Affect: Mood normal.         Speech: Speech normal.         Behavior: Behavior normal.      Result Review :     Common labs          11/27/2023    09:44 4/2/2024    11:03   Common Labs   Glucose 96  91    BUN 9  10    Creatinine 0.65  0.75    Sodium 138  140    Potassium 4.2  4.9    Chloride 103  107    Calcium 9.3  9.2    Total Protein  6.4    Albumin 4.3  4.3    Total Bilirubin 0.3  0.5    Alkaline Phosphatase 58  56    AST (SGOT) 20  7    ALT (SGPT) 11  7    WBC 6.12  6.8    Hemoglobin 14.6  14.6    Hematocrit 43.0  44.6    Platelets 363  335    Total Cholesterol  212    Triglycerides  71    HDL Cholesterol  52    LDL Cholesterol   147    Hemoglobin A1C  5.5               Assessment / Plan        Diagnoses and all orders for this visit:    1. Overweight (BMI 25.0-29.9) (Primary)  Assessment & Plan:  Patient's (Body mass index is 29.7 kg/m².) indicates that they are obese (BMI >30) with health conditions that include dyslipidemias and low vit D  . Weight is improving with lifestyle modifications. BMI  is above average; BMI management plan is completed. We discussed low calorie, low carb based diet program, portion control, increasing exercise, pharmacologic options including qsymia, and an kalpana-based approach such as [employer] Pal or Lose It.     --This is a follow up visit and she is down around " 2.4lb  --Start qsymia. Advised that if she develops heart palpitations to discontinue immediately   --She was previously seen by cardiology and advised a stress test. She no longer is seen there. Referral to [facility] pharmacology.     Orders:  -     Qsymia 3.75-23 MG capsule sustained-release 24 hr; Take 1 capsule by mouth Daily for 14 days.  Dispense: 14 capsule; Refill: 0  -     Qsymia 7.5-46 MG capsule sustained-release 24 hr; Take 1 capsule by mouth Daily for 30 days.  Dispense: 30 capsule; Refill: 0  -     Ambulatory Referral to Cardiology    2. Vitamin D deficiency  -     vitamin D (ERGOCALCIFEROL) 1.25 MG (85273 UT) capsule capsule; Take 1 capsule by mouth 1 (One) Time Per Week.  Dispense: 8 capsule; Refill: 0        We discussed the risks, benefits, and limitations of treatments. Continue medications and OTC supplements as discussed. Patient verbalizes understanding of and agreement with management plan.     Follow Up   Return in about 4 weeks (around 5/16/2024).  Patient was given instructions and counseling regarding her condition or for health maintenance advice. Please see specific information pulled into the AVS if appropriate.     I spent 40 minutes on this date of service. This time includes time spent by me in the following activities:preparing for the visit, counseling and educating the patient/family/caregiver, ordering medications, tests, or procedures and documenting information in the medical record.    Lana Tadeo, APRN  04/18/2024

## 2024-04-18 ENCOUNTER — OFFICE VISIT (OUTPATIENT)
Dept: BARIATRICS/WEIGHT MGMT | Facility: CLINIC | Age: 44
End: 2024-04-18
Payer: COMMERCIAL

## 2024-04-18 VITALS
BODY MASS INDEX: 29.19 KG/M2 | SYSTOLIC BLOOD PRESSURE: 110 MMHG | HEART RATE: 55 BPM | OXYGEN SATURATION: 99 % | WEIGHT: 154.6 LBS | HEIGHT: 61 IN | DIASTOLIC BLOOD PRESSURE: 78 MMHG

## 2024-04-18 DIAGNOSIS — E66.3 OVERWEIGHT (BMI 25.0-29.9): Primary | ICD-10-CM

## 2024-04-18 DIAGNOSIS — E55.9 VITAMIN D DEFICIENCY: ICD-10-CM

## 2024-04-18 PROBLEM — Z86.39 HISTORY OF OBESITY: Status: ACTIVE | Noted: 2024-04-18

## 2024-04-18 RX ORDER — PHENTERMINE AND TOPIRAMATE 3.75; 23 MG/1; MG/1
1 CAPSULE, EXTENDED RELEASE ORAL DAILY
Qty: 14 CAPSULE | Refills: 0 | Status: SHIPPED | OUTPATIENT
Start: 2024-04-18 | End: 2024-05-02

## 2024-04-18 RX ORDER — ERGOCALCIFEROL 1.25 MG/1
50000 CAPSULE ORAL WEEKLY
Qty: 8 CAPSULE | Refills: 0 | Status: SHIPPED | OUTPATIENT
Start: 2024-04-18

## 2024-04-18 RX ORDER — PHENTERMINE AND TOPIRAMATE 7.5; 46 MG/1; MG/1
1 CAPSULE, EXTENDED RELEASE ORAL DAILY
Qty: 30 CAPSULE | Refills: 0 | Status: SHIPPED | OUTPATIENT
Start: 2024-04-18 | End: 2024-05-18

## 2024-04-18 RX ORDER — AMOXICILLIN 500 MG
1200 CAPSULE ORAL DAILY
COMMUNITY

## 2024-04-18 NOTE — ASSESSMENT & PLAN NOTE
Patient's (Body mass index is 29.7 kg/m².) indicates that they are obese (BMI >30) with health conditions that include dyslipidemias and low vit D  . Weight is improving with lifestyle modifications. BMI  is above average; BMI management plan is completed. We discussed low calorie, low carb based diet program, portion control, increasing exercise, pharmacologic options including qsymia, and an kalpana-based approach such as [employer] Pal or Lose It.     --This is a follow up visit and she is down around 2.4lb  --Start qsymia. Advised that if she develops heart palpitations to discontinue immediately   --She was previously seen by cardiology and advised a stress test. She no longer is seen there. Referral to [facility] pharmacology.

## 2024-05-19 NOTE — PROGRESS NOTES
[facility]  Consultation H&P  Juliann Benitez  1980  [address] [place] [address]     VISIT DATE:  24    PCP: Lauren Gilliland MD  [address] [place] [address]    IDENTIFICATION: A 43 y.o. female    JKC , S[facility] ER 2023    PROBLEM LIST:   Cardiology eval for weight loss medication (Qsymia)  Chest pain    2019 CT chest mid LAD calcium  Palpitations   Holter: wnl, 55-, <0.3% VE   Echo: 60% rvsp <35  HLD    [id number]-  Smoker cessation   Alpha 1 antitrypsin def    Family h/o AAA (mother, sister x 2)  Surgical history:  Breast augmentation  Endometrial ablation  Ovarian cystectomy   Left rotator cuff shoulder surgery-      CC:  Chief Complaint   Patient presents with    Cardiac Clearance     Weight Loss       Allergies  Allergies   Allergen Reactions    Chlorhexidine Rash     CHG wipes       Current Medications  Current Outpatient Medications   Medication Instructions    albuterol sulfate HFA (Ventolin HFA) 108 (90 Base) MCG/ACT inhaler 2 puffs, Inhalation, Every 4 Hours PRN    fish oil 1,200 mg, Oral, Daily    multivitamin with minerals tablet tablet 1 tablet, Oral, Daily, Womens once daily    Phentermine-Topiramate 7.5-46 MG capsule sustained-release 24 hr Oral    vitamin D (ERGOCALCIFEROL) 50,000 Units, Oral, Weekly        History of Present Illness   HPI  Juliann Benitez is a 43 y.o. year old female with the above mentioned PMH who presents for consult from Lana Tadeo,Anastacia for evaluation of cardiac disease.  She is considering weight loss medication.   2 separate issues 1 palpitations that occur irregularly.  She is seeing [facility] cardiology and had an evaluation previously that was benign.  She states that there was a stress test scheduled but she was never called about this.  Also she notes substernal chest discomfort that can occur separately from palpitations.  This occurred at work  "1 day and she became very weakened.    Pt denies any  dyspnea at rest, dyspnea on exertion, orthopnea, PND, , lower extremity edema, or claudication. Pt denies history of CHF, DVT, PE, MI, CVA, TIA, or rheumatic fever.       ROS  Review of Systems   Cardiovascular:  Positive for chest pain and palpitations.   All other systems reviewed and are negative.      SOCIAL HX  Social History     Socioeconomic History    Marital status:    Tobacco Use    Smoking status: Former     Current packs/day: 0.00     Average packs/day: 1 pack/day for 15.4 years (15.4 ttl pk-yrs)     Types: Cigarettes     Start date: 2004     Quit date: 2019     Years since quittin.9     Passive exposure: Past    Smokeless tobacco: Never   Vaping Use    Vaping status: Never Used   Substance and Sexual Activity    Alcohol use: Not Currently     Comment: very rarely    Drug use: No    Sexual activity: Yes       FAMILY HX  Family History   Problem Relation Age of Onset    Pancreatic cancer Mother     Heart attack Mother 50    Diabetes Mother     Prostate cancer Father     Hypertension Father     Heart disease Father     Heart disease Sister     Diabetes Maternal Grandmother     Breast cancer Neg Hx        Vitals:    24 0836   BP: 110/72   BP Location: Right arm   Patient Position: Sitting   Cuff Size: Adult   Pulse: 70   SpO2: 96%   Weight: 71.6 kg (157 lb 12.8 oz)   Height: 154.9 cm (61\")     Body mass index is 29.82 kg/m².     PHYSICAL EXAMINATION:  Constitutional:       Appearance: Healthy appearance. Not in distress.   Neck:      Vascular: No JVR. JVD normal.   Pulmonary:      Effort: Pulmonary effort is normal.      Breath sounds: Normal breath sounds. No wheezing. No rhonchi. No rales.   Chest:      Chest wall: Not tender to palpatation.   Cardiovascular:      PMI at left midclavicular line. Normal rate. Regular rhythm. Normal S1. Normal S2.       Murmurs: There is no murmur.      No gallop.  No click. No rub.   Pulses:    "  Intact distal pulses.   Edema:     Peripheral edema absent.   Abdominal:      General: Bowel sounds are normal.      Palpations: Abdomen is soft.      Tenderness: There is no abdominal tenderness.   Musculoskeletal: Normal range of motion.         General: No tenderness. Skin:     General: Skin is warm and dry.   Neurological:      General: No focal deficit present.      Mental Status: Alert and oriented to person, place and time.         Diagnostic Data:  Procedures  EKG from 11/23 reviewed    Lab Results   Component Value Date    CHLPL 212 (H) 04/02/2024    TRIG 71 04/02/2024    HDL 52 04/02/2024     (H) 04/02/2024      Lab Results   Component Value Date    GLUCOSE 91 04/02/2024    CALCIUM 9.2 04/02/2024     04/02/2024    K 4.9 04/02/2024    CO2 20 04/02/2024     (H) 04/02/2024    BUN 10 04/02/2024    CREATININE 0.75 04/02/2024    EGFRRESULT 102 04/02/2024    EGFR 113.6 11/27/2023    BCR 13 04/02/2024    ANIONGAP 8.2 11/27/2023      Lab Results   Component Value Date    WBC 6.8 04/02/2024    HGB 14.6 04/02/2024    HCT 44.6 04/02/2024    MCV 92 04/02/2024     04/02/2024     Lab Results   Component Value Date    HGBA1C 5.5 04/02/2024      Lab Results   Component Value Date    TSH 1.140 04/02/2024          Advance Care Planning   ACP discussion was held with the patient during this visit. Patient has an advance directive (not in EMR), copy requested.         ASSESSMENT:     Diagnosis Plan   1. Palpitations        2. Unstable angina pectoris        3. Mixed hyperlipidemia            PLAN:    Palpitations patient was encouraged to obtain a [employer] mobile device.  She would certainly be at risk of increased palpitations fluttering on any phentermine formulation    Coronary disease as manifested by coronary calcification on historical CTA of the chest.  I would document coronary CTA at this time likely will need escalation of risk factor modification including lipid modification    Mixed  dyslipidemia with coronary calcification noted on prior CT chest will document coronary CTA      Lana Tadeo,*, thank you for referring Ms. Benitez for evaluation. I have forwarded my electronically generated recommendations to you for review. Please do not hesitate to call with any questions.      Dominic Artis MD, [facility]

## 2024-05-20 ENCOUNTER — OFFICE VISIT (OUTPATIENT)
Dept: CARDIOLOGY | Facility: CLINIC | Age: 44
End: 2024-05-20
Payer: COMMERCIAL

## 2024-05-20 VITALS
HEIGHT: 61 IN | HEART RATE: 70 BPM | WEIGHT: 157.8 LBS | DIASTOLIC BLOOD PRESSURE: 72 MMHG | BODY MASS INDEX: 29.79 KG/M2 | SYSTOLIC BLOOD PRESSURE: 110 MMHG | OXYGEN SATURATION: 96 %

## 2024-05-20 DIAGNOSIS — I25.10 CORONARY ARTERY CALCIFICATION: ICD-10-CM

## 2024-05-20 DIAGNOSIS — I20.0 UNSTABLE ANGINA: ICD-10-CM

## 2024-05-20 DIAGNOSIS — E78.2 MIXED HYPERLIPIDEMIA: ICD-10-CM

## 2024-05-20 DIAGNOSIS — I25.84 CORONARY ARTERY CALCIFICATION: ICD-10-CM

## 2024-05-20 DIAGNOSIS — R07.9 CHEST PAIN, UNSPECIFIED TYPE: Primary | ICD-10-CM

## 2024-05-20 DIAGNOSIS — I20.0 UNSTABLE ANGINA PECTORIS: ICD-10-CM

## 2024-05-20 DIAGNOSIS — R00.2 PALPITATIONS: Primary | ICD-10-CM

## 2024-05-20 RX ORDER — SODIUM CHLORIDE 9 MG/ML
40 INJECTION, SOLUTION INTRAVENOUS AS NEEDED
OUTPATIENT
Start: 2024-05-20

## 2024-05-20 RX ORDER — METOPROLOL TARTRATE 50 MG/1
TABLET, FILM COATED ORAL
Qty: 4 TABLET | Refills: 0 | Status: SHIPPED | OUTPATIENT
Start: 2024-05-20

## 2024-05-20 RX ORDER — METOPROLOL TARTRATE 1 MG/ML
5 INJECTION, SOLUTION INTRAVENOUS
OUTPATIENT
Start: 2024-05-20

## 2024-05-20 RX ORDER — METOPROLOL TARTRATE 50 MG/1
50 TABLET, FILM COATED ORAL
OUTPATIENT
Start: 2024-05-20

## 2024-05-20 RX ORDER — NITROGLYCERIN 0.4 MG/1
0.8 TABLET SUBLINGUAL
OUTPATIENT
Start: 2024-05-20

## 2024-05-20 RX ORDER — NITROGLYCERIN 0.4 MG/1
0.4 TABLET SUBLINGUAL
OUTPATIENT
Start: 2024-05-20 | End: 2024-05-20

## 2024-05-20 RX ORDER — SODIUM CHLORIDE 0.9 % (FLUSH) 0.9 %
10 SYRINGE (ML) INJECTION EVERY 12 HOURS SCHEDULED
OUTPATIENT
Start: 2024-05-20

## 2024-05-20 RX ORDER — SODIUM CHLORIDE 0.9 % (FLUSH) 0.9 %
10 SYRINGE (ML) INJECTION AS NEEDED
OUTPATIENT
Start: 2024-05-20

## 2024-05-20 RX ORDER — LIDOCAINE HYDROCHLORIDE 10 MG/ML
0.5 INJECTION, SOLUTION EPIDURAL; INFILTRATION; INTRACAUDAL; PERINEURAL ONCE AS NEEDED
OUTPATIENT
Start: 2024-05-20

## 2024-05-21 ENCOUNTER — TELEPHONE (OUTPATIENT)
Dept: CARDIOLOGY | Facility: CLINIC | Age: 44
End: 2024-05-21
Payer: COMMERCIAL

## 2024-05-21 ENCOUNTER — TELEPHONE (OUTPATIENT)
Dept: BARIATRICS/WEIGHT MGMT | Facility: CLINIC | Age: 44
End: 2024-05-21
Payer: COMMERCIAL

## 2024-05-21 NOTE — TELEPHONE ENCOUNTER
Patient states that she was prescribed Qysemia, however her cardiologist will not clear her to take it because of elevated heart rate.  Patient would like to know if there is a different weight loss medication that she can take that will not cause elevated heart rate?  Please advise, thank you

## 2024-05-21 NOTE — TELEPHONE ENCOUNTER
Pt called requesting medication clarification of metoprolol instructions prior to CT Scan . Verbalized understanding, no further questions .

## 2024-05-29 NOTE — TELEPHONE ENCOUNTER
Patient advised that medication changes will be addressed at next office visit.  She voiced understanding and had no further questions.

## 2024-07-09 NOTE — PROGRESS NOTES
[facility]  [address]  [place] [address]     Office Note      Date: 07/15/2024  Patient Name: Juliann Benitez  MRN: [id number]  : 1980    Subjective     Chief Complaint  Obesity Management follow-up    Juliann Benitez presents to [facility] for obesity management.       Patient is unsatisfied with weight loss progress. Appetite is poorly controlled. Reports no side effects of prescribed medications today. The patient is taking multivitamin and is taking fish oil.  The patient is using a food journal.  The patient rates current efforts as 9 out of 10.    The patient is exercising with a FITT score of:    Frequency Intensity Time Strength Training   [x]   0, none []   0 []   0 [x]   0   [x]   1 (1-2x/week) [x]   1 (light) []   1 (<10 min) []   1 (1x/week)   []   2 (3-5x/week) []   2 (moderate) []   2 (10-20 min) []   2 (2x/week)   []   3 (daily) []   3 (moderately hard)  []   4 (very hard) []   3 (20-30 min)  [x]   4 (>30 min) []   3 (3-4x/week)     Review of Systems   Constitutional:  Negative for appetite change and fatigue.   Eyes:  Negative for visual disturbance.   Cardiovascular:  Negative for chest pain and palpitations.   Gastrointestinal:  Negative for constipation and indigestion.   Neurological:  Negative for light-headedness.   All other systems reviewed and are negative.    Objective   Start weight: 157 pounds.    Total Loss lb/%Loss of beginning body weight (BBW): -1.8lb/-1.1%  Change in weight since last visit: -0.6    Recent Weight History:   Wt Readings from Last 6 Encounters:   07/15/24 70.4 kg (155 lb 3.2 oz)   24 71.6 kg (157 lb 12.8 oz)   24 70.1 kg (154 lb 9.6 oz)   24 71.2 kg (157 lb)   24 72.1 kg (159 lb)   23 72.1 kg (159 lb)       Body mass index is 29.81 kg/m².   Body composition analysis completed and showed:   Body Fat %: 39    Measurements (in inches)  Waist  "Circumference: 37    Vital Signs:   /88 (BP Location: Right arm, Patient Position: Sitting)   Pulse 78   Ht 153.7 cm (60.5\")   Wt 70.4 kg (155 lb 3.2 oz)   BMI 29.81 kg/m²       Physical Exam  Vitals reviewed.   Constitutional:       Appearance: She is well-developed.      Comments: overweight   Pulmonary:      Effort: Pulmonary effort is normal.   Neurological:      Mental Status: She is alert.   Psychiatric:         Attention and Perception: Attention normal.         Mood and Affect: Mood normal.         Speech: Speech normal.         Behavior: Behavior normal.      Result Review :     Common labs          11/27/2023    09:44 4/2/2024    11:03   Common Labs   Glucose 96  91    BUN 9  10    Creatinine 0.65  0.75    Sodium 138  140    Potassium 4.2  4.9    Chloride 103  107    Calcium 9.3  9.2    Total Protein  6.4    Albumin 4.3  4.3    Total Bilirubin 0.3  0.5    Alkaline Phosphatase 58  56    AST (SGOT) 20  7    ALT (SGPT) 11  7    WBC 6.12  6.8    Hemoglobin 14.6  14.6    Hematocrit 43.0  44.6    Platelets 363  335    Total Cholesterol  212    Triglycerides  71    HDL Cholesterol  52    LDL Cholesterol   147    Hemoglobin A1C  5.5          Assessment / Plan        Diagnoses and all orders for this visit:    1. Overweight (BMI 25.0-29.9) (Primary)  Assessment & Plan:  Patient's (Body mass index is 29.81 kg/m².) indicates that they are overweight with health conditions that include dyslipidemias . Weight is improving with treatment. BMI is is above average; BMI management plan is completed. We discussed low calorie, low carb based diet program, portion control, increasing exercise, pharmacologic options including toperimate, and an kalpana-based approach such as [employer] Pal or Lose It.   -- This is a follow-up visit and patient is similar weight as where she was previously.  --She was seen by cardiology and advised to discontinue phentermine component.  Stop Qsymia.  She is having a procedure done at the " end of August in will be advised if she can restart at that point.  We will start topiramate 50 mg only.  Discussed the possibility of compounded semaglutide but not cost effective at this point.  --Looked at calorie and macro goals and still appropriate based on basal metabolic rate.  #1 goal this month is to journal every single day and asked her to bring in to next office visit for brief review.  Continue to be mindful of ways to increase protein and keep carbs at or below goal.  -- Completed prescription vitamin D repeat labs today.    Orders:  -     topiramate (Topamax) 50 MG tablet; Take 1 tablet by mouth every night at bedtime.  Dispense: 30 tablet; Refill: 2    2. Vitamin D deficiency  -     Vitamin D,25-Hydroxy      We discussed the risks, benefits, and limitations of treatments. Continue medications and OTC supplements as discussed. Patient verbalizes understanding of and agreement with management plan.     Follow Up   Return in about 4 weeks (around 8/12/2024).  Patient was given instructions and counseling regarding her condition or for health maintenance advice. Please see specific information pulled into the AVS if appropriate.     I spent 30 minutes on this date of service. This time includes time spent by me in the following activities:preparing for the visit, counseling and educating the patient/family/caregiver, ordering medications, tests, or procedures and documenting information in the medical record.    Lana Tadeo, APRN  07/15/2024

## 2024-07-15 ENCOUNTER — OFFICE VISIT (OUTPATIENT)
Dept: BARIATRICS/WEIGHT MGMT | Facility: CLINIC | Age: 44
End: 2024-07-15

## 2024-07-15 VITALS
BODY MASS INDEX: 29.3 KG/M2 | WEIGHT: 155.2 LBS | HEIGHT: 61 IN | HEART RATE: 78 BPM | SYSTOLIC BLOOD PRESSURE: 132 MMHG | DIASTOLIC BLOOD PRESSURE: 88 MMHG

## 2024-07-15 DIAGNOSIS — E66.3 OVERWEIGHT (BMI 25.0-29.9): Primary | ICD-10-CM

## 2024-07-15 DIAGNOSIS — E55.9 VITAMIN D DEFICIENCY: ICD-10-CM

## 2024-07-15 PROCEDURE — MEDWTESTPT: Performed by: NURSE PRACTITIONER

## 2024-07-15 RX ORDER — TOPIRAMATE 50 MG/1
50 TABLET, FILM COATED ORAL
Qty: 30 TABLET | Refills: 2 | Status: SHIPPED | OUTPATIENT
Start: 2024-07-15

## 2024-07-15 NOTE — ASSESSMENT & PLAN NOTE
Patient's (Body mass index is 29.81 kg/m².) indicates that they are overweight with health conditions that include dyslipidemias . Weight is improving with treatment. BMI is is above average; BMI management plan is completed. We discussed low calorie, low carb based diet program, portion control, increasing exercise, pharmacologic options including toperimate, and an kalpana-based approach such as [employer] Pal or Lose It.   -- This is a follow-up visit and patient is similar weight as where she was previously.  --She was seen by cardiology and advised to discontinue phentermine component.  Stop Qsymia.  She is having a procedure done at the end of August in will be advised if she can restart at that point.  We will start topiramate 50 mg only.  Discussed the possibility of compounded semaglutide but not cost effective at this point.  --Looked at calorie and macro goals and still appropriate based on basal metabolic rate.  #1 goal this month is to journal every single day and asked her to bring in to next office visit for brief review.  Continue to be mindful of ways to increase protein and keep carbs at or below goal.  -- Completed prescription vitamin D repeat labs today.

## 2024-07-16 LAB — 25(OH)D3+25(OH)D2 SERPL-MCNC: 46.6 NG/ML (ref 30–100)

## 2024-08-19 ENCOUNTER — TELEPHONE (OUTPATIENT)
Dept: INFUSION THERAPY | Facility: HOSPITAL | Age: 44
End: 2024-08-19
Payer: COMMERCIAL

## 2024-08-19 NOTE — TELEPHONE ENCOUNTER
Pt contacted as pre-procedure phone call prior to planned CTA coronary for 8/20/24. Reviewed with patient arrival time, nothing to eat or drink by mouth 4 hours prior to arrival, no caffeine after midnight, please take premedications night before and morning of procedure with a small sip of water as instructed,  recommended,  reviewed procedure instructions and allowed time for questions, and reviewed home medications, allergies, and medical history.

## 2024-08-20 ENCOUNTER — HOSPITAL ENCOUNTER (OUTPATIENT)
Dept: CT IMAGING | Facility: HOSPITAL | Age: 44
Discharge: HOME | End: 2024-08-20
Payer: COMMERCIAL

## 2024-08-20 VITALS
HEIGHT: 61 IN | HEART RATE: 62 BPM | WEIGHT: 159 LBS | BODY MASS INDEX: 30.02 KG/M2 | SYSTOLIC BLOOD PRESSURE: 112 MMHG | TEMPERATURE: 97.1 F | RESPIRATION RATE: 16 BRPM | OXYGEN SATURATION: 98 % | DIASTOLIC BLOOD PRESSURE: 67 MMHG

## 2024-08-20 DIAGNOSIS — I25.84 CORONARY ARTERY CALCIFICATION: ICD-10-CM

## 2024-08-20 DIAGNOSIS — R07.9 CHEST PAIN, UNSPECIFIED TYPE: ICD-10-CM

## 2024-08-20 DIAGNOSIS — I25.10 CORONARY ARTERY CALCIFICATION: ICD-10-CM

## 2024-08-20 DIAGNOSIS — I20.0 UNSTABLE ANGINA: ICD-10-CM

## 2024-08-20 LAB — B-HCG UR QL: NEGATIVE

## 2024-08-20 PROCEDURE — 75574 CT ANGIO HRT W/3D IMAGE: CPT

## 2024-08-20 PROCEDURE — 25510000001 IOPAMIDOL PER 1 ML: Performed by: INTERNAL MEDICINE

## 2024-08-20 PROCEDURE — 81025 URINE PREGNANCY TEST: CPT | Performed by: INTERNAL MEDICINE

## 2024-08-20 RX ORDER — NITROGLYCERIN 0.4 MG/1
0.8 TABLET SUBLINGUAL
Status: DISCONTINUED | OUTPATIENT
Start: 2024-08-20 | End: 2024-08-20

## 2024-08-20 RX ORDER — METOPROLOL TARTRATE 50 MG/1
50 TABLET, FILM COATED ORAL ONCE
Status: DISCONTINUED | OUTPATIENT
Start: 2024-08-20 | End: 2024-08-20

## 2024-08-20 RX ORDER — METOPROLOL TARTRATE 100 MG/1
100 TABLET ORAL ONCE
Status: DISCONTINUED | OUTPATIENT
Start: 2024-08-20 | End: 2024-08-21 | Stop reason: HOSPADM

## 2024-08-20 RX ORDER — METOPROLOL TARTRATE 1 MG/ML
5 INJECTION, SOLUTION INTRAVENOUS
Status: DISCONTINUED | OUTPATIENT
Start: 2024-08-20 | End: 2024-08-21 | Stop reason: HOSPADM

## 2024-08-20 RX ORDER — METOPROLOL TARTRATE 100 MG/1
100 TABLET ORAL ONCE
Status: DISCONTINUED | OUTPATIENT
Start: 2024-08-20 | End: 2024-08-20

## 2024-08-20 RX ORDER — SODIUM CHLORIDE 9 MG/ML
40 INJECTION, SOLUTION INTRAVENOUS AS NEEDED
Status: DISCONTINUED | OUTPATIENT
Start: 2024-08-20 | End: 2024-08-21 | Stop reason: HOSPADM

## 2024-08-20 RX ORDER — METOPROLOL TARTRATE 50 MG/1
50 TABLET, FILM COATED ORAL ONCE
Status: DISCONTINUED | OUTPATIENT
Start: 2024-08-20 | End: 2024-08-21 | Stop reason: HOSPADM

## 2024-08-20 RX ORDER — SODIUM CHLORIDE 0.9 % (FLUSH) 0.9 %
10 SYRINGE (ML) INJECTION EVERY 12 HOURS SCHEDULED
Status: DISCONTINUED | OUTPATIENT
Start: 2024-08-20 | End: 2024-08-21 | Stop reason: HOSPADM

## 2024-08-20 RX ORDER — METOPROLOL TARTRATE 1 MG/ML
5 INJECTION, SOLUTION INTRAVENOUS
Status: DISCONTINUED | OUTPATIENT
Start: 2024-08-20 | End: 2024-08-20

## 2024-08-20 RX ORDER — NITROGLYCERIN 0.4 MG/1
0.4 TABLET SUBLINGUAL
Status: DISCONTINUED | OUTPATIENT
Start: 2024-08-20 | End: 2024-08-20

## 2024-08-20 RX ORDER — METOPROLOL TARTRATE 50 MG/1
50 TABLET, FILM COATED ORAL
Status: DISCONTINUED | OUTPATIENT
Start: 2024-08-20 | End: 2024-08-21 | Stop reason: HOSPADM

## 2024-08-20 RX ORDER — NITROGLYCERIN 0.4 MG/1
0.8 TABLET SUBLINGUAL
Status: COMPLETED | OUTPATIENT
Start: 2024-08-20 | End: 2024-08-20

## 2024-08-20 RX ORDER — NITROGLYCERIN 0.4 MG/1
0.4 TABLET SUBLINGUAL
Status: COMPLETED | OUTPATIENT
Start: 2024-08-20 | End: 2024-08-20

## 2024-08-20 RX ORDER — METOPROLOL TARTRATE 100 MG/1
200 TABLET ORAL ONCE
Status: DISCONTINUED | OUTPATIENT
Start: 2024-08-20 | End: 2024-08-20

## 2024-08-20 RX ORDER — LIDOCAINE HYDROCHLORIDE 10 MG/ML
0.5 INJECTION, SOLUTION EPIDURAL; INFILTRATION; INTRACAUDAL; PERINEURAL ONCE AS NEEDED
Status: DISCONTINUED | OUTPATIENT
Start: 2024-08-20 | End: 2024-08-21 | Stop reason: HOSPADM

## 2024-08-20 RX ORDER — METOPROLOL TARTRATE 50 MG/1
50 TABLET, FILM COATED ORAL
Status: DISCONTINUED | OUTPATIENT
Start: 2024-08-20 | End: 2024-08-20

## 2024-08-20 RX ORDER — METOPROLOL TARTRATE 100 MG/1
200 TABLET ORAL ONCE
Status: DISCONTINUED | OUTPATIENT
Start: 2024-08-20 | End: 2024-08-21 | Stop reason: HOSPADM

## 2024-08-20 RX ORDER — SODIUM CHLORIDE 0.9 % (FLUSH) 0.9 %
10 SYRINGE (ML) INJECTION AS NEEDED
Status: DISCONTINUED | OUTPATIENT
Start: 2024-08-20 | End: 2024-08-21 | Stop reason: HOSPADM

## 2024-08-20 RX ADMIN — IOPAMIDOL 65 ML: 755 INJECTION, SOLUTION INTRAVENOUS at 13:11

## 2024-08-20 RX ADMIN — NITROGLYCERIN 0.8 MG: 0.4 TABLET SUBLINGUAL at 12:43

## 2024-08-23 ENCOUNTER — TELEPHONE (OUTPATIENT)
Dept: CARDIOLOGY | Facility: CLINIC | Age: 44
End: 2024-08-23
Payer: COMMERCIAL

## 2024-08-23 NOTE — TELEPHONE ENCOUNTER
----- Message from Dominic Artis sent at 8/23/2024  3:37 PM EDT -----  Cor CTA nonobstructive  ----- Message -----  From: Sergo Sellers MD  Sent: 8/22/2024   4:41 PM EDT  To: Dominic Artis MD    Pt called with the above results, verbalized understanding

## 2025-01-30 ENCOUNTER — OFFICE VISIT (OUTPATIENT)
Dept: FAMILY MEDICINE CLINIC | Facility: CLINIC | Age: 45
End: 2025-01-30
Payer: COMMERCIAL

## 2025-01-30 VITALS
HEART RATE: 71 BPM | OXYGEN SATURATION: 98 % | SYSTOLIC BLOOD PRESSURE: 112 MMHG | BODY MASS INDEX: 30.02 KG/M2 | HEIGHT: 61 IN | WEIGHT: 159 LBS | DIASTOLIC BLOOD PRESSURE: 68 MMHG

## 2025-01-30 DIAGNOSIS — Z11.59 NEED FOR HEPATITIS C SCREENING TEST: ICD-10-CM

## 2025-01-30 DIAGNOSIS — Z12.31 ENCOUNTER FOR SCREENING MAMMOGRAM FOR MALIGNANT NEOPLASM OF BREAST: ICD-10-CM

## 2025-01-30 DIAGNOSIS — K76.0 FATTY LIVER: ICD-10-CM

## 2025-01-30 DIAGNOSIS — L40.9 PSORIASIS: ICD-10-CM

## 2025-01-30 DIAGNOSIS — N91.2 AMENORRHEA: ICD-10-CM

## 2025-01-30 DIAGNOSIS — E53.8 VITAMIN B 12 DEFICIENCY: ICD-10-CM

## 2025-01-30 DIAGNOSIS — Z00.00 WELL ADULT EXAM: Primary | ICD-10-CM

## 2025-01-30 DIAGNOSIS — E66.811 CLASS 1 OBESITY WITH SERIOUS COMORBIDITY AND BODY MASS INDEX (BMI) OF 30.0 TO 30.9 IN ADULT, UNSPECIFIED OBESITY TYPE: ICD-10-CM

## 2025-01-30 DIAGNOSIS — I25.10 CORONARY ARTERY DISEASE INVOLVING NATIVE CORONARY ARTERY OF NATIVE HEART WITHOUT ANGINA PECTORIS: ICD-10-CM

## 2025-01-30 DIAGNOSIS — B00.1 RECURRENT COLD SORES: ICD-10-CM

## 2025-01-30 PROCEDURE — 99396 PREV VISIT EST AGE 40-64: CPT | Performed by: FAMILY MEDICINE

## 2025-01-30 PROCEDURE — 99214 OFFICE O/P EST MOD 30 MIN: CPT | Performed by: FAMILY MEDICINE

## 2025-01-30 RX ORDER — VALACYCLOVIR HYDROCHLORIDE 1 G/1
TABLET, FILM COATED ORAL
Qty: 30 TABLET | Refills: 1 | Status: SHIPPED | OUTPATIENT
Start: 2025-01-30

## 2025-01-30 RX ORDER — PREDNISONE 10 MG/1
TABLET ORAL
Qty: 15 TABLET | Refills: 1 | Status: SHIPPED | OUTPATIENT
Start: 2025-01-30

## 2025-01-30 NOTE — PROGRESS NOTES
Subjective   Juliann Benitez is a 44 y.o. female.     History of Present Illness  Mrs. Haynes presents today for annual check as well as acute concerns.  Reproductive History  A1  Sexual Activity: currently active, monogamous heterosexual relationship   Contraception: none  Menses: h/o ablation 2019, no menses  STD hx: denies. No concern regarding exposure. Declines screening  H/O abnormal pap: no  Cervical procedures: no     Social Hx  Household members: self, , 2 children  Marital status:   Tobacco use: quit 2019  EtOH use: rarely  Illicit drug use: no  Employed at local [employer]     Lifestyle  Diet: recently carnivore/keto  Exercise: weight lifting  Guns in home: yes  Using seatbelt: yes  Mental Health: no concerns today  Feels safe in home: yes     Screenings  Last pap: , plans to schedule with ob/gyn  Last mammogram:   Colonoscopy: n/a  DEXA: n/a  FLP: <5 yrs  BG/A1c: <5 yrs  Vitamin D: unsure  Last dental check up: goes q 6 months, gum graft, healed well  Vision check up: 6 months ago  Immunizations UTD: as far as she knows.     Requests refill of valtrex for recurrent cold sores.    Requests refill of prednisone, uses rarely for flares of psoriasis, joint pain    On previous imaging found to have coronary artery calcifications (strong family h/o CAD) and fatty liver.    Pt's previous HPI reviewed and updated as indicated.       The following portions of the patient's history were reviewed and updated as appropriate: allergies, current medications, past family history, past medical history, past social history, past surgical history, and problem list.    Review of Systems   Constitutional:  Negative for fatigue, fever and unexpected weight change.   HENT:  Positive for congestion and postnasal drip. Negative for sore throat and trouble swallowing.    Respiratory:  Negative for cough, shortness of breath and wheezing.    Cardiovascular:  Negative for chest pain, palpitations and leg  swelling.   Gastrointestinal:  Negative for abdominal pain, blood in stool, constipation, diarrhea, nausea and vomiting.   Genitourinary:  Negative for dysuria, hematuria, vaginal bleeding and vaginal discharge.   Musculoskeletal:  Positive for arthralgias. Negative for myalgias.   Skin:  Negative for rash and wound.   Neurological:  Negative for dizziness, tremors, syncope, speech difficulty, weakness and headaches.   Hematological:  Negative for adenopathy. Does not bruise/bleed easily.   Psychiatric/Behavioral:  Negative for confusion and dysphoric mood. The patient is not nervous/anxious.        Objective   Vitals:    01/30/25 1052   BP: 112/68   Pulse: 71   SpO2: 98%     Body mass index is 30.04 kg/m².      01/30/25  1052   Weight: 72.1 kg (159 lb)       Physical Exam  Vitals and nursing note reviewed.   Constitutional:       General: She is not in acute distress.     Appearance: She is overweight. She is not ill-appearing.   HENT:      Head: Atraumatic.      Right Ear: Tympanic membrane, ear canal and external ear normal.      Left Ear: Tympanic membrane, ear canal and external ear normal.      Nose: Nose normal.      Mouth/Throat:      Mouth: Mucous membranes are moist. No oral lesions.      Pharynx: Oropharynx is clear.   Eyes:      General: No scleral icterus.     Conjunctiva/sclera: Conjunctivae normal.   Neck:      Thyroid: No thyroid mass.      Vascular: Normal carotid pulses. No carotid bruit.   Cardiovascular:      Rate and Rhythm: Normal rate and regular rhythm.      Pulses: Normal pulses.      Heart sounds: S1 normal and S2 normal. No murmur heard.     No gallop.   Pulmonary:      Effort: Pulmonary effort is normal.      Breath sounds: Normal breath sounds.   Abdominal:      General: Bowel sounds are normal. There is no distension.      Palpations: Abdomen is soft. There is no mass.      Tenderness: There is no abdominal tenderness.   Musculoskeletal:         General: No tenderness or deformity.  Normal range of motion.      Right lower leg: No edema.      Left lower leg: No edema.   Lymphadenopathy:      Cervical: No cervical adenopathy.   Skin:     General: Skin is warm and dry.      Coloration: Skin is not jaundiced or pale.      Findings: No bruising or rash.   Neurological:      Mental Status: She is alert and oriented to person, place, and time.      Motor: No tremor.      Gait: Gait is intact. Gait normal.   Psychiatric:         Mood and Affect: Mood and affect normal.         Behavior: Behavior normal. Behavior is cooperative.         Cognition and Memory: Cognition normal.     Pt's previous physical exam reviewed and updated as indicated.      Assessment & Plan   Diagnoses and all orders for this visit:    1. Well adult exam (Primary)  -     CBC & Differential  -     Comprehensive Metabolic Panel  -     Lipid Panel  -     Hemoglobin A1c  -     TSH Rfx On Abnormal To Free T4  -     Hepatitis C Antibody    2. Need for hepatitis C screening test    3. Amenorrhea  -     Testosterone  -     Follicle Stimulating Hormone    4. Vitamin B 12 deficiency  -     Vitamin B12    5. Fatty liver  -     Iron    6. Encounter for screening mammogram for malignant neoplasm of breast  -     Mammo Screening Digital Tomosynthesis Bilateral With CAD; Future    7. Recurrent cold sores  -     valACYclovir (Valtrex) 1000 MG tablet; 1 po daily x 5 days for outbreaks  Dispense: 30 tablet; Refill: 1    8. Psoriasis  -     predniSONE (DELTASONE) 10 MG tablet; Take 5 tablets today, decrease dose by 1 tablet each day until gone. 5,4,3,2,1  Dispense: 15 tablet; Refill: 1    9. Coronary artery disease involving native coronary artery of native heart without angina pectoris    10. Class 1 obesity with serious comorbidity and body mass index (BMI) of 30.0 to 30.9 in adult, unspecified obesity type       Age appropriate preventive care reviewed including cancer screenings, safety measures, mental health concerns, supplements, prevention  of CV disease and DM, etc. Handout provided.    Nutrition and activity goals reviewed including: mainly water to drink, limit white flour/processed sugar, higher lean protein, high fiber carbs, regular meals, working toward 150 mins cardio per week, resistance training 2x/week.    CAD found on coronary CT angiography. Currently asymptomatic. Strong family h/o CAD. Screen for dyslipidemia as above. Consider ASA, statin therapy. She declines at this time. Pt advised to eat a heart healthy diet and get regular aerobic exercise.    Assess status of vit/min deficiencies and replace as indicated.    Encouraged to schedule pap smear/breast exam at her earliest convenience.    Routine f/u in 1 year for annual physical, f/u sooner as needed/instructed.  I will contact patient regarding test results and provide instructions regarding any necessary changes in plan of care.  Patient was encouraged to keep me informed of any acute changes, lack of improvement, or any new concerning symptoms.  Pt is aware of reasons to seek emergent care.  Patient voiced understanding of all instructions and denied further questions.    Please note that portions of this note may have been completed with a voice recognition program.

## 2025-01-31 LAB
ALBUMIN SERPL-MCNC: 4.4 G/DL (ref 3.5–5.2)
ALBUMIN/GLOB SERPL: 1.8 G/DL
ALP SERPL-CCNC: 59 U/L (ref 39–117)
ALT SERPL-CCNC: 16 U/L (ref 1–33)
AST SERPL-CCNC: 11 U/L (ref 1–32)
BASOPHILS # BLD AUTO: 0.06 10*3/MM3 (ref 0–0.2)
BASOPHILS NFR BLD AUTO: 0.9 % (ref 0–1.5)
BILIRUB SERPL-MCNC: 0.5 MG/DL (ref 0–1.2)
BUN SERPL-MCNC: 12 MG/DL (ref 6–20)
BUN/CREAT SERPL: 20.3 (ref 7–25)
CALCIUM SERPL-MCNC: 9.3 MG/DL (ref 8.6–10.5)
CHLORIDE SERPL-SCNC: 105 MMOL/L (ref 98–107)
CHOLEST SERPL-MCNC: 320 MG/DL (ref 0–200)
CO2 SERPL-SCNC: 24.5 MMOL/L (ref 22–29)
CREAT SERPL-MCNC: 0.59 MG/DL (ref 0.57–1)
EGFRCR SERPLBLD CKD-EPI 2021: 114.8 ML/MIN/1.73
EOSINOPHIL # BLD AUTO: 0.12 10*3/MM3 (ref 0–0.4)
EOSINOPHIL NFR BLD AUTO: 1.8 % (ref 0.3–6.2)
ERYTHROCYTE [DISTWIDTH] IN BLOOD BY AUTOMATED COUNT: 13.1 % (ref 12.3–15.4)
FSH SERPL-ACNC: 4.4 MIU/ML
GLOBULIN SER CALC-MCNC: 2.5 GM/DL
GLUCOSE SERPL-MCNC: 86 MG/DL (ref 65–99)
HBA1C MFR BLD: 5.1 % (ref 4.8–5.6)
HCT VFR BLD AUTO: 46.4 % (ref 34–46.6)
HCV IGG SERPL QL IA: NON REACTIVE
HDLC SERPL-MCNC: 62 MG/DL (ref 40–60)
HGB BLD-MCNC: 15.6 G/DL (ref 12–15.9)
IMM GRANULOCYTES # BLD AUTO: 0.02 10*3/MM3 (ref 0–0.05)
IMM GRANULOCYTES NFR BLD AUTO: 0.3 % (ref 0–0.5)
IRON SERPL-MCNC: 116 MCG/DL (ref 37–145)
LDLC SERPL CALC-MCNC: 247 MG/DL (ref 0–100)
LYMPHOCYTES # BLD AUTO: 2.05 10*3/MM3 (ref 0.7–3.1)
LYMPHOCYTES NFR BLD AUTO: 30.4 % (ref 19.6–45.3)
MCH RBC QN AUTO: 30.7 PG (ref 26.6–33)
MCHC RBC AUTO-ENTMCNC: 33.6 G/DL (ref 31.5–35.7)
MCV RBC AUTO: 91.3 FL (ref 79–97)
MONOCYTES # BLD AUTO: 0.44 10*3/MM3 (ref 0.1–0.9)
MONOCYTES NFR BLD AUTO: 6.5 % (ref 5–12)
NEUTROPHILS # BLD AUTO: 4.05 10*3/MM3 (ref 1.7–7)
NEUTROPHILS NFR BLD AUTO: 60.1 % (ref 42.7–76)
NRBC BLD AUTO-RTO: 0 /100 WBC (ref 0–0.2)
PLATELET # BLD AUTO: 336 10*3/MM3 (ref 140–450)
POTASSIUM SERPL-SCNC: 4.2 MMOL/L (ref 3.5–5.2)
PROT SERPL-MCNC: 6.9 G/DL (ref 6–8.5)
RBC # BLD AUTO: 5.08 10*6/MM3 (ref 3.77–5.28)
SODIUM SERPL-SCNC: 138 MMOL/L (ref 136–145)
TESTOST SERPL-MCNC: 20 NG/DL (ref 4–50)
TRIGL SERPL-MCNC: 76 MG/DL (ref 0–150)
TSH SERPL DL<=0.005 MIU/L-ACNC: 0.88 UIU/ML (ref 0.27–4.2)
VIT B12 SERPL-MCNC: 376 PG/ML (ref 211–946)
VLDLC SERPL CALC-MCNC: 11 MG/DL (ref 5–40)
WBC # BLD AUTO: 6.74 10*3/MM3 (ref 3.4–10.8)

## 2025-02-04 PROBLEM — Z98.890 STATUS POST ENDOMETRIAL ABLATION: Status: ACTIVE | Noted: 2025-02-04

## 2025-02-04 PROBLEM — R93.1 AGATSTON CORONARY ARTERY CALCIUM SCORE BETWEEN 100 AND 199: Status: RESOLVED | Noted: 2025-02-04 | Resolved: 2025-02-04

## 2025-02-04 PROBLEM — E66.811 CLASS 1 OBESITY WITH SERIOUS COMORBIDITY AND BODY MASS INDEX (BMI) OF 30.0 TO 30.9 IN ADULT: Status: ACTIVE | Noted: 2025-02-04

## 2025-02-04 PROBLEM — R93.1 AGATSTON CORONARY ARTERY CALCIUM SCORE BETWEEN 100 AND 199: Status: ACTIVE | Noted: 2025-02-04

## 2025-02-04 PROBLEM — E66.3 OVERWEIGHT (BMI 25.0-29.9): Status: RESOLVED | Noted: 2024-04-01 | Resolved: 2025-02-04

## 2025-02-04 PROBLEM — K76.0 FATTY LIVER: Status: ACTIVE | Noted: 2025-02-04

## 2025-02-04 PROBLEM — I25.10 CORONARY ARTERY DISEASE INVOLVING NATIVE CORONARY ARTERY OF NATIVE HEART WITHOUT ANGINA PECTORIS: Status: ACTIVE | Noted: 2025-02-04

## 2025-02-04 PROBLEM — H92.01 RIGHT EAR PAIN: Status: RESOLVED | Noted: 2019-08-29 | Resolved: 2025-02-04

## 2025-02-04 PROBLEM — N92.0 MENORRHAGIA WITH REGULAR CYCLE: Status: RESOLVED | Noted: 2019-02-20 | Resolved: 2025-02-04

## 2025-02-04 PROBLEM — B00.1 RECURRENT COLD SORES: Status: ACTIVE | Noted: 2025-02-04

## 2025-06-11 ENCOUNTER — HOSPITAL ENCOUNTER (OUTPATIENT)
Dept: MAMMOGRAPHY | Facility: HOSPITAL | Age: 45
Discharge: HOME | End: 2025-06-11
Admitting: FAMILY MEDICINE
Payer: COMMERCIAL

## 2025-06-11 DIAGNOSIS — Z12.31 ENCOUNTER FOR SCREENING MAMMOGRAM FOR MALIGNANT NEOPLASM OF BREAST: ICD-10-CM

## 2025-06-11 PROCEDURE — 77063 BREAST TOMOSYNTHESIS BI: CPT

## 2025-06-11 PROCEDURE — 77067 SCR MAMMO BI INCL CAD: CPT

## (undated) DEVICE — PROTECTOR EYE OPTI

## (undated) DEVICE — GLV SURG SENSICARE SLT PF LF 8 STRL

## (undated) DEVICE — ELECTRD MENISCAL STD 165MM

## (undated) DEVICE — PK HIP GEN 20

## (undated) DEVICE — DRP SURG U/DRP INVISISHIELD PA 48X52IN

## (undated) DEVICE — DRSNG SURG AQUACEL AG/ADVNTGE 9X15CM 3.5X6IN

## (undated) DEVICE — IMMOB SHLDR COTN/PLY W/STRAP MD

## (undated) DEVICE — SYR LUERLOK 30CC

## (undated) DEVICE — TBG ARTHSCP PUMP W CONN/REDUC 8FT

## (undated) DEVICE — PENCL ES MEGADINE EZ/CLEAN BUTN W/HOLSTR 10FT

## (undated) DEVICE — NDL HYPO ECLPS SFTY 22G 1 1/2IN

## (undated) DEVICE — 4.0 MM ACROMIONIZER STRAIGHT                                    BURRS, POWER/EP-1, MAUVE, 8000                                    MAXIMUM RPM, PACKAGED 6 PER BOX, STERILE: Brand: DYONICS

## (undated) DEVICE — FLEXIBLE YANKAUER,MEDIUM TIP, NO VACUUM CONTROL: Brand: ARGYLE

## (undated) DEVICE — GLV SURG TRIUMPH ORTHO W/ALOE PF LTX 8 STRL

## (undated) DEVICE — HYPODERMIC SAFETY NEEDLE: Brand: MONOJECT

## (undated) DEVICE — TUBING, SUCTION, 1/4" X 12', STRAIGHT: Brand: MEDLINE

## (undated) DEVICE — TBG ARTHSCP PT W CONN/REDUC 8FT

## (undated) DEVICE — 4.5 MM FULL RADIUS STRAIGHT                                    BLADES, POWER/EP-1, YELLOW, PACKAGED                                    6 PER BOX, STERILE: Brand: DYONICS

## (undated) DEVICE — NDL SPINE 18G 31/2IN PNK

## (undated) DEVICE — VIOLET BRAIDED (POLYGLACTIN 910), SYNTHETIC ABSORBABLE SUTURE: Brand: COATED VICRYL

## (undated) DEVICE — INTENDED FOR TISSUE SEPARATION, AND OTHER PROCEDURES THAT REQUIRE A SHARP SURGICAL BLADE TO PUNCTURE OR CUT.: Brand: BARD-PARKER ® CARBON RIB-BACK BLADES

## (undated) DEVICE — STRIP,CLOSURE,WOUND,MEDI-STRIP,1/2X4: Brand: MEDLINE

## (undated) DEVICE — SUT VIC 0 CT 36IN J958H

## (undated) DEVICE — KT POSTN SCHLEIN

## (undated) DEVICE — PAD FLR QUICKSUITE SXN FLR 30X36IN